# Patient Record
Sex: FEMALE | Race: ASIAN | NOT HISPANIC OR LATINO | Employment: UNEMPLOYED | URBAN - METROPOLITAN AREA
[De-identification: names, ages, dates, MRNs, and addresses within clinical notes are randomized per-mention and may not be internally consistent; named-entity substitution may affect disease eponyms.]

---

## 2018-07-18 ENCOUNTER — OFFICE VISIT (OUTPATIENT)
Dept: OBSTETRICS AND GYNECOLOGY | Age: 28
End: 2018-07-18

## 2018-07-18 VITALS
HEIGHT: 60 IN | WEIGHT: 90 LBS | DIASTOLIC BLOOD PRESSURE: 68 MMHG | SYSTOLIC BLOOD PRESSURE: 100 MMHG | BODY MASS INDEX: 17.67 KG/M2

## 2018-07-18 DIAGNOSIS — Z12.4 ROUTINE CERVICAL SMEAR: ICD-10-CM

## 2018-07-18 DIAGNOSIS — Z01.411 ENCOUNTER FOR GYNECOLOGICAL EXAMINATION WITH ABNORMAL FINDING: ICD-10-CM

## 2018-07-18 DIAGNOSIS — Z31.69 ENCOUNTER FOR PRECONCEPTION CONSULTATION: ICD-10-CM

## 2018-07-18 DIAGNOSIS — N93.9 ABNORMAL UTERINE BLEEDING (AUB): Primary | ICD-10-CM

## 2018-07-18 PROCEDURE — 99213 OFFICE O/P EST LOW 20 MIN: CPT | Performed by: OBSTETRICS & GYNECOLOGY

## 2018-07-18 PROCEDURE — 99385 PREV VISIT NEW AGE 18-39: CPT | Performed by: OBSTETRICS & GYNECOLOGY

## 2018-07-18 RX ORDER — FOLIC ACID 1 MG/1
1 TABLET ORAL DAILY
COMMUNITY
End: 2018-09-17

## 2018-07-18 NOTE — PROGRESS NOTES
Subjective     Chief Complaint   Patient presents with   • Gynecologic Exam     New AC, trying for pregnancy       History of Present Illness    Devika Ryan is a 28 y.o.  who presents for annual exam.  Patient is here for her first visit in our office.  She is here with her .  Patient is from [place] and is moved here in [place] for about 2 years.  She and her  would like to try for pregnancy in the next year or so.  She does have a history of chickenpox.  Her  is healthy.  He is a nonsmoker and has no history of testicular problems.    Her cycles have always been irregular.  She does not have hirsutism or galactorrhea.  She has not had any testing for irregular periods.  Her body weight is low at 90 pounds.  She does eat regular meals and exercises by walking about a half an hour daily.    Patient has never had a Pap smear.  Her menses are every 30-40days, lasting 7 days , dysmenorrhea mild, occurring first 1-2 days of flow   Obstetric History:  OB History      Para Term  AB Living    0 0 0 0 0 0    SAB TAB Ectopic Molar Multiple Live Births    0 0 0 0 0 0         Menstrual History:     Patient's last menstrual period was 2018.         Current contraception: coitus interruptus  History of abnormal Pap smear: no  Received Gardasil immunization: no  Perform regular self breast exam: no  Family history of uterine or ovarian cancer: no  Family History of colon cancer: no  Family history of breast cancer: no    Mammogram: not indicated.  Colonoscopy: not indicated.  DEXA: not indicated.    Exercise: walks daily for 30- 60 minutes   Calcium/Vitamin D: adequate intake    The following portions of the patient's history were reviewed and updated as appropriate: allergies, current medications, past family history, past medical history, past social history, past surgical history and problem list.    Review of Systems    Review of Systems   Constitutional: Negative for fatigue.  "  Respiratory: Negative for shortness of breath.    Gastrointestinal: Negative for abdominal pain.   Genitourinary: Negative for dysuria.   Neurological: Negative for headaches.   Psychiatric/Behavioral: Negative for dysphoric mood.         Objective   Physical Exam    /68   Ht 152.4 cm (60\")   Wt 40.8 kg (90 lb)   LMP 07/08/2018   BMI 17.58 kg/m²     General:   alert, appears stated age and cooperative   Neck: thyroid normal to palpation   Heart: regular rate and rhythm   Lungs: clear to auscultation bilaterally   Abdomen: soft, non-tender, without masses or organomegaly   Breast: inspection negative, no nipple discharge or bleeding, no masses or nodularity palpable   Vulva: normal, Bartholin's, Urethra, [place]'s normal   Vagina: normal mucosa, normal discharge   Cervix: no cervical motion tenderness and no lesions   Uterus: mobile, non-tender, normal shape and consistency   Adnexa: no mass, fullness, tenderness   Rectal: not indicated     Assessment/Plan   Devika was seen today for gynecologic exam.    Diagnoses and all orders for this visit:    Abnormal uterine bleeding (AUB)  -     TSH  -     Prolactin  -     Rubella Antibody, IgG    Encounter for gynecological examination with abnormal finding  -     IGP,rfx Aptima HPV All Pth    Routine cervical smear  -     IGP,rfx Aptima HPV All Pth      We discussed causes of irregular bleeding.  We will check labs today.  I encouraged the patient to do ovulation predictor kits.  If they are negative she will call.  We discussed that if she is not ovulating we could consider Clomid.  We discussed there is a risk of twins with Clomid pregnancies.  We also discussed patient's low body weight.  I discussed that sometimes with weight gain cycles will become ovulatory.  Continue folic acid.  All questions answered.  Breast self exam technique reviewed and patient encouraged to perform self-exam monthly.  Discussed healthy lifestyle modifications.  Recommended 30 minutes " of aerobic exercise five times per week.  Discussed calcium needs to prevent osteoporosis.

## 2018-07-19 ENCOUNTER — TELEPHONE (OUTPATIENT)
Dept: OBSTETRICS AND GYNECOLOGY | Age: 28
End: 2018-07-19

## 2018-07-19 DIAGNOSIS — N92.6 IRREGULAR PERIODS/MENSTRUAL CYCLES: Primary | ICD-10-CM

## 2018-07-19 LAB
PROLACTIN SERPL-MCNC: 24.1 NG/ML (ref 4.8–23.3)
RUBV IGG SERPL IA-ACNC: 8.49 INDEX
TSH SERPL DL<=0.005 MIU/L-ACNC: 3.27 MIU/ML (ref 0.27–4.2)

## 2018-07-19 NOTE — TELEPHONE ENCOUNTER
----- Message from Guille Thomas MD sent at 7/19/2018  2:16 PM EDT -----  Come in for repeat prolactin fasting and TSH with free t4 due to irregular cycles

## 2018-07-21 LAB
PROLACTIN SERPL-MCNC: 40.5 NG/ML (ref 4.8–23.3)
T4 FREE SERPL-MCNC: 1.66 NG/DL (ref 0.93–1.7)
TSH SERPL DL<=0.005 MIU/L-ACNC: 4.55 MIU/ML (ref 0.27–4.2)

## 2018-07-23 ENCOUNTER — TELEPHONE (OUTPATIENT)
Dept: OBSTETRICS AND GYNECOLOGY | Age: 28
End: 2018-07-23

## 2018-07-23 DIAGNOSIS — E22.1 HYPERPROLACTINEMIA (HCC): ICD-10-CM

## 2018-07-23 DIAGNOSIS — E03.8 SUBCLINICAL HYPOTHYROIDISM: Primary | ICD-10-CM

## 2018-07-23 PROBLEM — R79.89 ELEVATED PROLACTIN LEVEL: Status: ACTIVE | Noted: 2018-07-23

## 2018-07-23 NOTE — TELEPHONE ENCOUNTER
called requesting to speak with Dr. Thomas with some follow-up questions from her call to them this am. Please advise.

## 2018-07-24 LAB
CONV .: NORMAL
CYTOLOGIST CVX/VAG CYTO: NORMAL
CYTOLOGY CVX/VAG DOC THIN PREP: NORMAL
DX ICD CODE: NORMAL
HIV 1 & 2 AB SER-IMP: NORMAL
Lab: NORMAL
OTHER STN SPEC: NORMAL
PATH REPORT.FINAL DX SPEC: NORMAL
STAT OF ADQ CVX/VAG CYTO-IMP: NORMAL

## 2018-07-26 ENCOUNTER — TELEPHONE (OUTPATIENT)
Dept: OBSTETRICS AND GYNECOLOGY | Age: 28
End: 2018-07-26

## 2018-07-26 NOTE — TELEPHONE ENCOUNTER
Pt notd pap normal and blood work abnormal. Pt is going to see endocrinologist due to abnormal labs

## 2018-07-26 NOTE — TELEPHONE ENCOUNTER
----- Message from Guille Thomas MD sent at 7/25/2018  5:23 PM EDT -----  Please notify the pap is normal. We have takled about the blood work that is abnormal and the patient is referred to the endocrinologist.

## 2018-09-17 ENCOUNTER — OFFICE VISIT (OUTPATIENT)
Dept: ENDOCRINOLOGY | Age: 28
End: 2018-09-17

## 2018-09-17 VITALS
SYSTOLIC BLOOD PRESSURE: 108 MMHG | HEART RATE: 62 BPM | OXYGEN SATURATION: 95 % | BODY MASS INDEX: 18.22 KG/M2 | HEIGHT: 60 IN | WEIGHT: 92.8 LBS | DIASTOLIC BLOOD PRESSURE: 60 MMHG

## 2018-09-17 DIAGNOSIS — E03.8 SUBCLINICAL HYPOTHYROIDISM: Primary | ICD-10-CM

## 2018-09-17 DIAGNOSIS — R79.89 ELEVATED PROLACTIN LEVEL: ICD-10-CM

## 2018-09-17 PROCEDURE — 99204 OFFICE O/P NEW MOD 45 MIN: CPT | Performed by: INTERNAL MEDICINE

## 2018-09-17 NOTE — PROGRESS NOTES
Chief Complaint   Patient presents with   • Hypothyroidism   • Abnormal Lab   NEW PATIENT APPOINTMENT. HYPOTHYROIDISM/HYPERPROLACTINEMIA    Devika Ryan 28 y.o.  female presents as a new patient for the evaluation of Hypothyroidism. Consulted by .   Pt thyroid levels are checked as part of planning her pregnancy work up.  Prolactin levels were checked and when she reported some irregularities in her menstrual cycles.    Today in the clinic patient reports that her energy levels are good, weight has been stable, normal bowel movements, some hair loss, no increased sweating, no dry skin and sleep is okay.  Normal temperature preference.  No complaints of tremors, racing of heart or eye symptoms.  Denied c/o difficulty breathing, swallowing and change in voice.   No family hx of thyroid disease.     Menarche at age 15, periods have been always irregular, does get 1 period at least every 30-45 days, last for about 5-6 days, no heavy administration.  Never been pregnant.    Patient was noted to have incidental elevated prolactin levels on the blood work up due to the history of menstrual irregularities.  Not on any recreational drug usage, no complaints of breast tenderness, breast discharge, occasional headaches mainly related to her neck pain.  No temporal loss of vision.    Reviewed primary care physician's/consulting physician documentation and lab results :     I have reviewed the patient's allergies, medicines, past medical hx, family hx and social hx in detail.    No past medical history on file.    Family History   Problem Relation Age of Onset   • Heart disease Maternal Grandmother    • No Known Problems Mother    • No Known Problems Father        Social History     Social History   • Marital status:      Spouse name: N/A   • Number of children: N/A   • Years of education: N/A     Occupational History   • homemaker      Social History Main Topics   • Smoking status: Never Smoker   • Smokeless  "tobacco: Not on file   • Alcohol use No   • Drug use: No   • Sexual activity: Yes     Partners: Male     Birth control/ protection: Coitus interruptus     Other Topics Concern   • Not on file     Social History Narrative   • No narrative on file       Allergies   Allergen Reactions   • Penicillins Rash         Current Outpatient Prescriptions:   •  Prenatal Multivit-Min-Fe-FA (PRE- PO), Take  by mouth., Disp: , Rfl:      Review of Systems   Constitutional: Negative for appetite change, fatigue and fever.   Eyes: Positive for visual disturbance.   Respiratory: Negative for shortness of breath.    Cardiovascular: Negative for palpitations and leg swelling.   Gastrointestinal: Negative for abdominal pain and vomiting.   Endocrine: Negative for polydipsia and polyuria.   Musculoskeletal: Positive for neck pain. Negative for joint swelling.   Skin: Negative for rash.   Neurological: Negative for weakness and numbness.   Psychiatric/Behavioral: Negative for behavioral problems.       Objective:    /60   Pulse 62   Ht 152.4 cm (60\")   Wt 42.1 kg (92 lb 12.8 oz)   SpO2 95%   BMI 18.12 kg/m²     Physical Exam   Constitutional: She is oriented to person, place, and time. She appears well-nourished.   HENT:   Head: Normocephalic and atraumatic.   Eyes: Conjunctivae and EOM are normal. No scleral icterus.   Neck: Normal range of motion. Neck supple. No thyromegaly present.   Cardiovascular: Normal rate and normal heart sounds.  Exam reveals no friction rub.    No murmur heard.  Pulmonary/Chest: Effort normal and breath sounds normal. No stridor. She has no wheezes. She has no rales.   Abdominal: Soft. Bowel sounds are normal. She exhibits no distension. There is no tenderness.   Musculoskeletal: She exhibits no edema or tenderness.   Lymphadenopathy:     She has no cervical adenopathy.   Neurological: She is alert and oriented to person, place, and time.   Skin: Skin is warm and dry. She is not diaphoretic. " "  Psychiatric: She has a normal mood and affect.   Vitals reviewed.      Results Review:    I reviewed the patient's new clinical results.    Telephone on 07/19/2018   Component Date Value Ref Range Status   • Prolactin 07/20/2018 40.5* 4.8 - 23.3 ng/mL Final   • TSH 07/20/2018 4.550* 0.270 - 4.200 mIU/mL Final   • Free T4 07/20/2018 1.66  0.93 - 1.70 ng/dL Final         Devika was seen today for hypothyroidism and abnormal lab.    Diagnoses and all orders for this visit:    Subclinical hypothyroidism  -     TSH  -     T4, Free  -     Prolactin  -     Comprehensive Metabolic Panel  -     Macroprolactin  -     TSH; Future  -     T4, Free; Future    Elevated prolactin level (CMS/HCC)  -     TSH  -     T4, Free  -     Prolactin  -     Comprehensive Metabolic Panel  -     Macroprolactin  -     TSH; Future  -     T4, Free; Future      Hypothyroidism-levels are elevated  Explained to the patient that since she is planning pregnancy her TSH levels should be closely monitored and well controlled.  Will repeat the thyroid function tests today and based on the levels will start the patient on levothyroxin  Discussed the benefits and the side effects of the medication  Explained to the patient that during pregnancy thyroid levels needs to be closely monitored and her dosage was need to be adjusted.    Elevated prolactin level could be related to hypothyroidism  Will repeat the prolactin levels.  Illness prolactin levels are still elevated after patient being levothyroid will have to perform an MRI of the pituitary  Also discussed with the patient options of treatment for hyperprolactinemia-Cabergoline/bromocriptine.  We will check macro prolactin levels.    Thank you for asking me to see your patient, Devika Ryan in consultation.        Kristen Lainez MD  09/17/18    EMR Dragon / transcription disclaimer:     \"Dictated utilizing Dragon dictation\".          "

## 2018-09-21 DIAGNOSIS — E22.1 HYPERPROLACTINEMIA (HCC): Primary | ICD-10-CM

## 2018-09-21 LAB
ALBUMIN SERPL-MCNC: 4.5 G/DL (ref 3.5–5.2)
ALBUMIN/GLOB SERPL: 1.4 G/DL
ALP SERPL-CCNC: 52 U/L (ref 39–117)
ALT SERPL-CCNC: 20 U/L (ref 1–33)
AST SERPL-CCNC: 14 U/L (ref 1–32)
BILIRUB SERPL-MCNC: 0.2 MG/DL (ref 0.1–1.2)
BUN SERPL-MCNC: 15 MG/DL (ref 6–20)
BUN/CREAT SERPL: 20.8 (ref 7–25)
CALCIUM SERPL-MCNC: 9.6 MG/DL (ref 8.6–10.5)
CHLORIDE SERPL-SCNC: 103 MMOL/L (ref 98–107)
CO2 SERPL-SCNC: 26 MMOL/L (ref 22–29)
CREAT SERPL-MCNC: 0.72 MG/DL (ref 0.57–1)
GLOBULIN SER CALC-MCNC: 3.2 GM/DL
GLUCOSE SERPL-MCNC: 106 MG/DL (ref 65–99)
MACROPROLACTIN/PROLACTIN SFR SERPL: 4 %
POTASSIUM SERPL-SCNC: 4.2 MMOL/L (ref 3.5–5.2)
PROLACTIN SERPL-MCNC: 27 NG/ML
PROLACTIN SERPL-MCNC: 30.9 NG/ML (ref 4.8–23.3)
PROLACTIN.MONOMERIC SERPL-MCNC: 26 NG/ML
PROT SERPL-MCNC: 7.7 G/DL (ref 6–8.5)
SODIUM SERPL-SCNC: 142 MMOL/L (ref 136–145)
T4 FREE SERPL-MCNC: 1.65 NG/DL (ref 0.93–1.7)
TSH SERPL DL<=0.005 MIU/L-ACNC: 3.74 MIU/ML (ref 0.27–4.2)

## 2018-09-21 NOTE — PROGRESS NOTES
Tried to call patient no voice mail was set up to leave message sent message though [employer] and mailed results

## 2018-09-24 ENCOUNTER — TELEPHONE (OUTPATIENT)
Dept: ENDOCRINOLOGY | Age: 28
End: 2018-09-24

## 2018-09-24 NOTE — TELEPHONE ENCOUNTER
spoke with patient  about patient lad results and that someone will call to setup the appointment for patient MRI-    ---- Message from Stacey Goode sent at 9/24/2018 11:56 AM EDT -----  Contact: Dolly -   Patient  is calling in regards to patient lab results and wants a call back .    Best [phone]

## 2018-09-28 RX ORDER — LEVOTHYROXINE SODIUM 0.07 MG/1
TABLET ORAL
Qty: 30 TABLET | Refills: 11 | Status: SHIPPED | OUTPATIENT
Start: 2018-09-28 | End: 2018-12-20 | Stop reason: DRUGHIGH

## 2018-10-20 ENCOUNTER — APPOINTMENT (OUTPATIENT)
Dept: MRI IMAGING | Facility: HOSPITAL | Age: 28
End: 2018-10-20
Attending: INTERNAL MEDICINE

## 2018-11-16 ENCOUNTER — TELEPHONE (OUTPATIENT)
Dept: ENDOCRINOLOGY | Age: 28
End: 2018-11-16

## 2018-11-16 NOTE — TELEPHONE ENCOUNTER
Called patient was unable to leave voice mail will sent results to patient madan          ----- Message from Vivian Peguero sent at 11/16/2018  8:44 AM EST -----  Contact: [phone]  Pt is looking for the results of the MRI    Please call patient

## 2018-12-11 ENCOUNTER — LAB (OUTPATIENT)
Dept: ENDOCRINOLOGY | Age: 28
End: 2018-12-11

## 2018-12-11 DIAGNOSIS — E03.8 SUBCLINICAL HYPOTHYROIDISM: ICD-10-CM

## 2018-12-11 DIAGNOSIS — E22.1 HYPERPROLACTINEMIA (HCC): ICD-10-CM

## 2018-12-11 DIAGNOSIS — R79.89 ELEVATED PROLACTIN LEVEL: ICD-10-CM

## 2018-12-12 LAB
PROLACTIN SERPL-MCNC: 24 NG/ML (ref 4.8–23.3)
T4 FREE SERPL-MCNC: 1.91 NG/DL (ref 0.93–1.7)
TSH SERPL DL<=0.005 MIU/L-ACNC: 0.8 MIU/ML (ref 0.27–4.2)

## 2018-12-20 RX ORDER — LEVOTHYROXINE SODIUM 0.03 MG/1
25 TABLET ORAL DAILY
Qty: 30 TABLET | Refills: 11 | Status: SHIPPED | OUTPATIENT
Start: 2018-12-20 | End: 2018-12-24 | Stop reason: SDUPTHER

## 2018-12-20 NOTE — TELEPHONE ENCOUNTER
Spoke with patient  homer let him know that Dr Lainez wants to change her medication from  37.5 mcg of the synthroid to 25 mcg of the synthroid  Patient  voice understanding    ----- Message from Edda Viera sent at 12/19/2018  1:58 PM EST -----  Contact: Kinga -     Kinga said patient thought her appointment was Friday and she no showed her appt today. He is asking if patient needs an adjustment on her medication based on 12-11-18 labs.     Kinga - [phone]

## 2018-12-24 RX ORDER — LEVOTHYROXINE SODIUM 0.03 MG/1
25 TABLET ORAL DAILY
Qty: 30 TABLET | Refills: 11 | Status: SHIPPED | OUTPATIENT
Start: 2018-12-24 | End: 2020-01-17

## 2019-02-20 ENCOUNTER — LAB (OUTPATIENT)
Dept: ENDOCRINOLOGY | Age: 29
End: 2019-02-20

## 2019-02-20 DIAGNOSIS — E03.8 SUBCLINICAL HYPOTHYROIDISM: ICD-10-CM

## 2019-02-20 DIAGNOSIS — E22.1 HYPERPROLACTINEMIA (HCC): Primary | ICD-10-CM

## 2019-02-20 DIAGNOSIS — E22.1 HYPERPROLACTINEMIA (HCC): ICD-10-CM

## 2019-02-21 LAB
ALBUMIN SERPL-MCNC: 4.6 G/DL (ref 3.5–5.5)
ALBUMIN/GLOB SERPL: 1.7 {RATIO} (ref 1.2–2.2)
ALP SERPL-CCNC: 59 IU/L (ref 39–117)
ALT SERPL-CCNC: 16 IU/L (ref 0–32)
AST SERPL-CCNC: 14 IU/L (ref 0–40)
BILIRUB SERPL-MCNC: 0.3 MG/DL (ref 0–1.2)
BUN SERPL-MCNC: 12 MG/DL (ref 6–20)
BUN/CREAT SERPL: 18 (ref 9–23)
CALCIUM SERPL-MCNC: 9.4 MG/DL (ref 8.7–10.2)
CHLORIDE SERPL-SCNC: 109 MMOL/L (ref 96–106)
CO2 SERPL-SCNC: 22 MMOL/L (ref 20–29)
CREAT SERPL-MCNC: 0.66 MG/DL (ref 0.57–1)
GLOBULIN SER CALC-MCNC: 2.7 G/DL (ref 1.5–4.5)
GLUCOSE SERPL-MCNC: 110 MG/DL (ref 65–99)
POTASSIUM SERPL-SCNC: 4.3 MMOL/L (ref 3.5–5.2)
PROLACTIN SERPL-MCNC: 26.3 NG/ML (ref 4.8–23.3)
PROT SERPL-MCNC: 7.3 G/DL (ref 6–8.5)
SODIUM SERPL-SCNC: 146 MMOL/L (ref 134–144)
T4 FREE SERPL-MCNC: 1.7 NG/DL (ref 0.82–1.77)
TSH SERPL DL<=0.005 MIU/L-ACNC: 1.07 UIU/ML (ref 0.45–4.5)

## 2019-02-25 ENCOUNTER — OFFICE VISIT (OUTPATIENT)
Dept: ENDOCRINOLOGY | Age: 29
End: 2019-02-25

## 2019-02-25 VITALS
DIASTOLIC BLOOD PRESSURE: 70 MMHG | SYSTOLIC BLOOD PRESSURE: 108 MMHG | HEIGHT: 61 IN | HEART RATE: 101 BPM | BODY MASS INDEX: 18.28 KG/M2 | WEIGHT: 96.8 LBS

## 2019-02-25 DIAGNOSIS — E22.1 HYPERPROLACTINEMIA (HCC): Primary | ICD-10-CM

## 2019-02-25 DIAGNOSIS — E03.9 ACQUIRED HYPOTHYROIDISM: ICD-10-CM

## 2019-02-25 PROCEDURE — 99214 OFFICE O/P EST MOD 30 MIN: CPT | Performed by: INTERNAL MEDICINE

## 2019-02-25 RX ORDER — CABERGOLINE 0.5 MG/1
TABLET ORAL
Qty: 6 TABLET | Refills: 3 | Status: SHIPPED | OUTPATIENT
Start: 2019-02-25 | End: 2020-02-25 | Stop reason: SDUPTHER

## 2019-02-25 NOTE — PROGRESS NOTES
29 y.o.    Patient Care Team:  Provider, No Known as PCP - General    Chief Complaint:        F/u HYPOTHYROIDISM  HYPERPROLACTINEMIA  HERE TO DISCUSS LAB RESULTS.     Subjective     HPI    29-year-old  female is here as a follow-up for the evaluation of elevated prolactin levels, hypothyroidism.  Patient's thyroid levels were checked as a part of her planning to get pregnant.  Prolactin levels were checked when she reported some irregularities in her menstrual cycles.    Based on her workup during her last visit patient was started on levothyroxine 25 mcg oral daily.  With improvement of her thyroid levels her prolactin levels were also noted to be improving.  However prolactin levels are still mildly elevated-24-26 on her latest blood workup.  MRI of the pituitary did not show any pituitary microadenoma or abnormal features.    Today in the clinic she reports that her energy levels are okay, weight has been stable, normal bowel movements, some hair loss, no increased sweating or dry skin.  Sleep is good.  Normal temperature preference.  No hypothyroid symptoms.    Last menstrual cycle was in January 2019, she reports that her.  Her stated irregular happens anywhere between 40-50 days.  Menstrual flow is decent.     Reviewed primary care physician's/consulting physician documentation and lab results :     Interval History      The following portions of the patient's history were reviewed and updated as appropriate: allergies, current medications, past family history, past medical history, past social history, past surgical history and problem list.    History reviewed. No pertinent past medical history.  Family History   Problem Relation Age of Onset   • Heart disease Maternal Grandmother    • No Known Problems Mother    • No Known Problems Father      Social History     Socioeconomic History   • Marital status:      Spouse name: Not on file   • Number of children: Not on file   • Years of education:  "Not on file   • Highest education level: Not on file   Social Needs   • Financial resource strain: Not on file   • Food insecurity - worry: Not on file   • Food insecurity - inability: Not on file   • Transportation needs - medical: Not on file   • Transportation needs - non-medical: Not on file   Occupational History   • Occupation: homemaker   Tobacco Use   • Smoking status: Never Smoker   • Smokeless tobacco: Never Used   Substance and Sexual Activity   • Alcohol use: No   • Drug use: No   • Sexual activity: Yes     Partners: Male     Birth control/protection: Coitus interruptus   Other Topics Concern   • Not on file   Social History Narrative   • Not on file     Allergies   Allergen Reactions   • Penicillins Rash       Current Outpatient Medications:   •  levothyroxine (SYNTHROID, LEVOTHROID) 25 MCG tablet, Take 1 tablet by mouth Daily., Disp: 30 tablet, Rfl: 11  •  Prenatal Multivit-Min-Fe-FA (PRE-ANAHI PO), Take  by mouth., Disp: , Rfl:   •  cabergoline (DOSTINEX) 0.5 MG tablet, TAKE HALF A TABLET A WEEK, Disp: 6 tablet, Rfl: 3        Review of Systems   Constitutional: Negative for chills, fatigue and fever.   Cardiovascular: Negative for chest pain and palpitations.   Gastrointestinal: Negative for abdominal pain, constipation, diarrhea, nausea and vomiting.   Endocrine: Negative for cold intolerance and heat intolerance.       Objective       Vitals:    19 1255   BP: 108/70   Pulse: 101   Weight: 43.9 kg (96 lb 12.8 oz)   Height: 154.9 cm (61\")     Body mass index is 18.29 kg/m².      Physical Exam   Constitutional: She is oriented to person, place, and time. She appears well-nourished.   HENT:   Head: Normocephalic and atraumatic.   Eyes: Conjunctivae and EOM are normal. No scleral icterus.   Neck: Normal range of motion. Neck supple. No thyromegaly present.   Cardiovascular: Normal rate and normal heart sounds. Exam reveals no friction rub.   No murmur heard.  Pulmonary/Chest: Effort normal and " breath sounds normal. No stridor. She has no wheezes. She has no rales.   Abdominal: Soft. Bowel sounds are normal. She exhibits no distension. There is no tenderness.   Musculoskeletal: She exhibits no edema or tenderness.   Lymphadenopathy:     She has no cervical adenopathy.   Neurological: She is alert and oriented to person, place, and time.   Skin: Skin is warm and dry. She is not diaphoretic.   Psychiatric: She has a normal mood and affect.   Vitals reviewed.    Results Review:     I reviewed the patient's new clinical results and mentioned them above in HPI and in plan as well.    Medical records reviewed  Summary: done      Lab on 02/20/2019   Component Date Value Ref Range Status   • Prolactin 02/20/2019 26.3* 4.8 - 23.3 ng/mL Final   • TSH 02/20/2019 1.070  0.450 - 4.500 uIU/mL Final   • Free T4 02/20/2019 1.70  0.82 - 1.77 ng/dL Final   • Glucose 02/20/2019 110* 65 - 99 mg/dL Final   • BUN 02/20/2019 12  6 - 20 mg/dL Final   • Creatinine 02/20/2019 0.66  0.57 - 1.00 mg/dL Final   • eGFR Non  Am 02/20/2019 119  >59 mL/min/1.73 Final   • eGFR African Am 02/20/2019 137  >59 mL/min/1.73 Final   • BUN/Creatinine Ratio 02/20/2019 18  9 - 23 Final   • Sodium 02/20/2019 146* 134 - 144 mmol/L Final   • Potassium 02/20/2019 4.3  3.5 - 5.2 mmol/L Final   • Chloride 02/20/2019 109* 96 - 106 mmol/L Final   • Total CO2 02/20/2019 22  20 - 29 mmol/L Final   • Calcium 02/20/2019 9.4  8.7 - 10.2 mg/dL Final   • Total Protein 02/20/2019 7.3  6.0 - 8.5 g/dL Final   • Albumin 02/20/2019 4.6  3.5 - 5.5 g/dL Final   • Globulin 02/20/2019 2.7  1.5 - 4.5 g/dL Final   • A/G Ratio 02/20/2019 1.7  1.2 - 2.2 Final   • Total Bilirubin 02/20/2019 0.3  0.0 - 1.2 mg/dL Final   • Alkaline Phosphatase 02/20/2019 59  39 - 117 IU/L Final   • AST (SGOT) 02/20/2019 14  0 - 40 IU/L Final   • ALT (SGPT) 02/20/2019 16  0 - 32 IU/L Final     No results found for: HGBA1C  Lab Results   Component Value Date    CREATININE 0.66 02/20/2019  "    Imaging Results (most recent)     None                Assessment and Plan:    Diagnoses and all orders for this visit:    Hyperprolactinemia (CMS/HCC)  -     TSH; Future  -     T4, Free; Future  -     Hemoglobin A1c; Future  -     Vitamin D 25 Hydroxy; Future  -     Vitamin B12 & Folate; Future  -     Prolactin; Future  -     Basic Metabolic Panel; Future    Acquired hypothyroidism  -     TSH; Future  -     T4, Free; Future  -     Hemoglobin A1c; Future  -     Vitamin D 25 Hydroxy; Future  -     Vitamin B12 & Folate; Future  -     Prolactin; Future  -     Basic Metabolic Panel; Future    Other orders  -     cabergoline (DOSTINEX) 0.5 MG tablet; TAKE HALF A TABLET A WEEK      Hyperprolactinemia  Prolactin levels are weakly mildly elevated  Patient is asymptomatic and pituitary MRI was unremarkable  Starting the patient on cabergoline 1.25 mg once a week to regularize her menstrual cycles and help conceive.  Advised the patient to stop the medication immediately when she gets pregnant.    Hypothyroidism  Continue levothyroxine 25 mcg oral daily  Recommended the patient to call and alert us immediately when she gets to know she is pregnant sTSH levels are specific in each trimester.      Reviewed Lab results with the patient.             Kristen Lainez MD  02/25/19    EMR Dragon / transcription disclaimer:     \"Dictated utilizing Dragon dictation\".  "

## 2019-05-31 ENCOUNTER — TELEPHONE (OUTPATIENT)
Dept: OBSTETRICS AND GYNECOLOGY | Age: 29
End: 2019-05-31

## 2019-06-04 ENCOUNTER — OFFICE VISIT (OUTPATIENT)
Dept: OBSTETRICS AND GYNECOLOGY | Age: 29
End: 2019-06-04

## 2019-06-04 VITALS
SYSTOLIC BLOOD PRESSURE: 110 MMHG | WEIGHT: 96 LBS | BODY MASS INDEX: 18.85 KG/M2 | HEIGHT: 60 IN | DIASTOLIC BLOOD PRESSURE: 70 MMHG

## 2019-06-04 DIAGNOSIS — N76.0 ACUTE VAGINITIS: ICD-10-CM

## 2019-06-04 DIAGNOSIS — R31.9 HEMATURIA, UNSPECIFIED TYPE: Primary | ICD-10-CM

## 2019-06-04 LAB
BILIRUB BLD-MCNC: NEGATIVE MG/DL
CLARITY, POC: CLEAR
COLOR UR: ABNORMAL
GLUCOSE UR STRIP-MCNC: NEGATIVE MG/DL
KETONES UR QL: NEGATIVE
LEUKOCYTE EST, POC: ABNORMAL
NITRITE UR-MCNC: NEGATIVE MG/ML
PH UR: 5.5 [PH] (ref 5–8)
PROT UR STRIP-MCNC: NEGATIVE MG/DL
RBC # UR STRIP: ABNORMAL /UL
SP GR UR: 1 (ref 1–1.03)
UROBILINOGEN UR QL: NORMAL

## 2019-06-04 PROCEDURE — 81002 URINALYSIS NONAUTO W/O SCOPE: CPT | Performed by: NURSE PRACTITIONER

## 2019-06-04 PROCEDURE — 99213 OFFICE O/P EST LOW 20 MIN: CPT | Performed by: NURSE PRACTITIONER

## 2019-06-04 RX ORDER — LACTOBACILLUS RHAMNOSUS GG 10B CELL
CAPSULE ORAL DAILY
COMMUNITY
End: 2020-11-02

## 2019-06-04 NOTE — PROGRESS NOTES
"Subjective   Imani Ryan is a 29 y.o. female is being seen today for   Chief Complaint   Patient presents with   • Vaginitis     Pt c/o some blood in urine, burning with urination and vaginal discharge.    .    History of Present Illness     Patient noticed some vaginal itching and some burning after urination and thought she saw blood in urine  They are TTC and did get a +OPK this month  It was right around that time that she noticed the symptoms and since then they have gone away  She currently denies any itching or dysuria but wanted to check and make sure everything was ok      The following portions of the patient's history were reviewed and updated as appropriate: allergies, current medications, past family history, past medical history, past social history, past surgical history and problem list.    /70   Ht 152.4 cm (60\")   Wt 43.5 kg (96 lb)   LMP 05/07/2019   BMI 18.75 kg/m²         Review of Systems   Constitutional: Negative.    HENT: Negative.    Eyes: Negative.    Respiratory: Negative.    Cardiovascular: Negative.    Gastrointestinal: Negative.    Endocrine: Negative.    Genitourinary: Negative.    Musculoskeletal: Negative.    Skin: Negative.    Allergic/Immunologic: Negative.    Neurological: Negative.    Hematological: Negative.    Psychiatric/Behavioral: Negative.        Objective   Physical Exam   Constitutional: She is oriented to person, place, and time. She appears well-developed and well-nourished.   Genitourinary: Vagina normal and uterus normal. Uterus is not tender. Cervix exhibits no motion tenderness, no discharge and no friability.   Neurological: She is alert and oriented to person, place, and time.   Skin: Skin is warm and dry.   Psychiatric: She has a normal mood and affect.         Assessment/Plan   Imani was seen today for vaginitis.    Diagnoses and all orders for this visit:    Hematuria, unspecified type  -     POC Urinalysis Dipstick  -     NuSwab BV & Candida - " Swab, Vagina  -     Urine Culture - Urine, Urine, Clean Catch      Normal exam today, moderate blood in urine patient patient is asymptomatic at this time.  Will check vaginal culture and urine culture and call with results. Increase water intake. She will check pregnancy test if late on cycle and call with +.  Otherwise continue OPKs and timed IC, call with + HCG.

## 2019-06-07 ENCOUNTER — TELEPHONE (OUTPATIENT)
Dept: OBSTETRICS AND GYNECOLOGY | Age: 29
End: 2019-06-07

## 2019-06-07 LAB
A VAGINAE DNA VAG QL NAA+PROBE: NORMAL SCORE
BVAB2 DNA VAG QL NAA+PROBE: NORMAL SCORE
C ALBICANS DNA VAG QL NAA+PROBE: NEGATIVE
C GLABRATA DNA VAG QL NAA+PROBE: NEGATIVE
MEGA1 DNA VAG QL NAA+PROBE: NORMAL SCORE

## 2019-06-08 LAB
BACTERIA UR CULT: ABNORMAL
BACTERIA UR CULT: ABNORMAL
OTHER ANTIBIOTIC SUSC ISLT: ABNORMAL

## 2019-06-10 RX ORDER — NITROFURANTOIN 25; 75 MG/1; MG/1
100 CAPSULE ORAL 2 TIMES DAILY
Qty: 14 CAPSULE | Refills: 0 | Status: SHIPPED | OUTPATIENT
Start: 2019-06-10 | End: 2020-01-13

## 2019-08-24 ENCOUNTER — RESULTS ENCOUNTER (OUTPATIENT)
Dept: ENDOCRINOLOGY | Age: 29
End: 2019-08-24

## 2019-08-24 DIAGNOSIS — E22.1 HYPERPROLACTINEMIA (HCC): ICD-10-CM

## 2019-08-24 DIAGNOSIS — E03.9 ACQUIRED HYPOTHYROIDISM: ICD-10-CM

## 2020-01-13 ENCOUNTER — OFFICE VISIT (OUTPATIENT)
Dept: OBSTETRICS AND GYNECOLOGY | Age: 30
End: 2020-01-13

## 2020-01-13 VITALS
WEIGHT: 100 LBS | HEIGHT: 60 IN | BODY MASS INDEX: 19.63 KG/M2 | SYSTOLIC BLOOD PRESSURE: 102 MMHG | DIASTOLIC BLOOD PRESSURE: 64 MMHG

## 2020-01-13 DIAGNOSIS — R79.89 ELEVATED PROLACTIN LEVEL: ICD-10-CM

## 2020-01-13 DIAGNOSIS — E03.8 SUBCLINICAL HYPOTHYROIDISM: ICD-10-CM

## 2020-01-13 DIAGNOSIS — Z11.51 SPECIAL SCREENING EXAMINATION FOR HUMAN PAPILLOMAVIRUS (HPV): ICD-10-CM

## 2020-01-13 DIAGNOSIS — Z12.4 SCREENING FOR MALIGNANT NEOPLASM OF THE CERVIX: ICD-10-CM

## 2020-01-13 DIAGNOSIS — N97.9 INFERTILITY, FEMALE: Primary | ICD-10-CM

## 2020-01-13 PROCEDURE — 99395 PREV VISIT EST AGE 18-39: CPT | Performed by: OBSTETRICS & GYNECOLOGY

## 2020-01-13 NOTE — PROGRESS NOTES
Subjective     Chief Complaint   Patient presents with   • Gynecologic Exam     AC and wants to discuss trying       History of Present Illness    Devika Ryan is a 29 y.o.  who presents for annual exam.  The patient is here today with her .  They have been trying for pregnancy for about 1 year.  We did check some labs last year which showed some abnormalities of the thyroid and elevated prolactin.  Patient saw endocrinology but did not do her follow-up labs in August.  She is currently on once weekly dosing for elevated prolactin and daily thyroid dosing.  She has gained about 5 pounds.  She did have a positive ovulation predictor kit but late in the cycle on day 17.  She is 2 days late for her cycle but urine pregnancy test at home was negative.  She would like to have blood pregnancy test done.  Her  did a normal semen analysis through [email or web address].  He is able to show me the results on his phone which I did review.  There is no paper copy.  Her cycles have regulated on her 2 medications except for 2 menses in .    Her menses are regular every 30 days, lasting 6-8 days, dysmenorrhea mild, occurring first 1-2 days of flow   Obstetric History:  OB History        0    Para   0    Term   0       0    AB   0    Living   0       SAB   0    TAB   0    Ectopic   0    Molar   0    Multiple   0    Live Births   0               Menstrual History:     Patient's last menstrual period was 2019.         Current contraception: none  History of abnormal Pap smear: no  Received Gardasil immunization: no  Perform regular self breast exam : no  Family history of uterine or ovarian cancer: no  Family History of colon cancer: no  Family history of breast cancer: no    Mammogram: not indicated.  Colonoscopy: not indicated.  DEXA: not indicated.    Exercise: yoga 3-4 times week   Calcium/Vitamin D: adequate intake    The following portions of the patient's history were  "reviewed and updated as appropriate: allergies, current medications, past family history, past medical history, past social history, past surgical history and problem list.    Review of Systems    Review of Systems   Constitutional: Negative for fatigue.   Respiratory: Negative for shortness of breath.    Gastrointestinal: Negative for abdominal pain.   Genitourinary: Negative for dysuria.   Neurological: Negative for headaches.   Psychiatric/Behavioral: Negative for dysphoric mood.         Objective   Physical Exam    /64   Ht 152.4 cm (60\")   Wt 45.4 kg (100 lb)   LMP 12/11/2019   Breastfeeding No   BMI 19.53 kg/m²     General:   alert, appears stated age and cooperative   Neck: thyroid normal to palpation   Heart: regular rate and rhythm   Lungs: clear to auscultation bilaterally   Abdomen: soft, non-tender, without masses or organomegaly   Breast: inspection negative, no nipple discharge or bleeding, no masses or nodularity palpable   Vulva: normal, Bartholin's, Urethra, [place]'s normal   Vagina: normal mucosa, normal discharge   Cervix: no cervical motion tenderness and no lesions   Uterus: non-tender, normal shape and consistency   Adnexa: no mass, fullness, tenderness   Rectal: not indicated     Assessment/Plan   Imani was seen today for gynecologic exam.    Diagnoses and all orders for this visit:    Infertility, female  -     HCG, B-subunit, Quantitative  -     Rubella Antibody, IgG  -     Ambulatory Referral to Infertility  -     Basic Metabolic Panel  -     Prolactin  -     Vitamin B12  -     Folate  -     Vitamin D 25 Hydroxy  -     Hemoglobin A1c  -     T4, Free  -     TSH    Elevated prolactin level (CMS/HCC)  -     HCG, B-subunit, Quantitative  -     Rubella Antibody, IgG  -     Ambulatory Referral to Infertility  -     Basic Metabolic Panel  -     Prolactin  -     Vitamin B12  -     Folate  -     Vitamin D 25 Hydroxy  -     Hemoglobin A1c  -     T4, Free  -     TSH    Subclinical " hypothyroidism  -     HCG, B-subunit, Quantitative  -     Rubella Antibody, IgG  -     Ambulatory Referral to Infertility  -     Basic Metabolic Panel  -     Prolactin  -     Vitamin B12  -     Folate  -     Vitamin D 25 Hydroxy  -     Hemoglobin A1c  -     T4, Free  -     TSH    Screening for malignant neoplasm of the cervix  -     IGP, Aptima HPV, Rfx 16 / 18,45    Special screening examination for human papillomavirus (HPV)  -     IGP, Aptima HPV, Rfx 16 / 18,45    Other orders  -     clomiPHENE (CLOMID) 50 MG tablet; Take 1 tablet by mouth Daily.    We had a long discussion.  We will check the labs that endocrinology ordered and also add rubella titer and serum hCG.  Patient will return in a few weeks for a pelvic ultrasound.  We discussed that the patient is ovulating but ovulating late in the cycle.  We did discuss empiric Clomid.  She will be referred to infertility through Dr. Moses.  We went through her cycle in detail and reviewed the calendar.  We discussed risk and benefits of Clomid including 15% risk of twins, risk of ovarian cyst and hot flashes.  I will call the patient with her lab results and she will come back for her ultrasound.    All questions answered.  Breast self exam technique reviewed and patient encouraged to perform self-exam monthly.  Discussed healthy lifestyle modifications.  Recommended 30 minutes of aerobic exercise five times per week.  Discussed calcium needs to prevent osteoporosis.

## 2020-01-14 ENCOUNTER — TELEPHONE (OUTPATIENT)
Dept: OBSTETRICS AND GYNECOLOGY | Age: 30
End: 2020-01-14

## 2020-01-14 LAB
25(OH)D3+25(OH)D2 SERPL-MCNC: 33.8 NG/ML (ref 30–100)
BUN SERPL-MCNC: 15 MG/DL (ref 6–20)
BUN/CREAT SERPL: 18.5 (ref 7–25)
CALCIUM SERPL-MCNC: 9.7 MG/DL (ref 8.6–10.5)
CHLORIDE SERPL-SCNC: 103 MMOL/L (ref 98–107)
CO2 SERPL-SCNC: 26.7 MMOL/L (ref 22–29)
CREAT SERPL-MCNC: 0.81 MG/DL (ref 0.57–1)
FOLATE SERPL-MCNC: >20 NG/ML (ref 4.78–24.2)
GLUCOSE SERPL-MCNC: 92 MG/DL (ref 65–99)
HBA1C MFR BLD: 5.3 % (ref 4.8–5.6)
HCG INTACT+B SERPL-ACNC: <0.5 MIU/ML
POTASSIUM SERPL-SCNC: 4.7 MMOL/L (ref 3.5–5.2)
PROLACTIN SERPL-MCNC: 5.1 NG/ML (ref 4.8–23.3)
RUBV IGG SERPL IA-ACNC: 9.2 INDEX
SODIUM SERPL-SCNC: 145 MMOL/L (ref 136–145)
T4 FREE SERPL-MCNC: 1.69 NG/DL (ref 0.93–1.7)
TSH SERPL DL<=0.005 MIU/L-ACNC: 3 UIU/ML (ref 0.27–4.2)
VIT B12 SERPL-MCNC: 1167 PG/ML (ref 211–946)

## 2020-01-14 NOTE — TELEPHONE ENCOUNTER
----- Message from Guille Thomas MD sent at 1/14/2020 12:38 PM EST -----  Please notify blood work is normal.

## 2020-01-16 ENCOUNTER — TELEPHONE (OUTPATIENT)
Dept: OBSTETRICS AND GYNECOLOGY | Age: 30
End: 2020-01-16

## 2020-01-16 LAB
CYTOLOGIST CVX/VAG CYTO: NORMAL
CYTOLOGY CVX/VAG DOC CYTO: NORMAL
CYTOLOGY CVX/VAG DOC THIN PREP: NORMAL
DX ICD CODE: NORMAL
HIV 1 & 2 AB SER-IMP: NORMAL
HPV I/H RISK 4 DNA CVX QL PROBE+SIG AMP: NEGATIVE
Lab: NORMAL
OTHER STN SPEC: NORMAL
STAT OF ADQ CVX/VAG CYTO-IMP: NORMAL

## 2020-01-16 NOTE — TELEPHONE ENCOUNTER
----- Message from Guille Thomas MD sent at 1/16/2020  9:44 AM EST -----  Please notify pap is normal.

## 2020-01-17 ENCOUNTER — TELEPHONE (OUTPATIENT)
Dept: OBSTETRICS AND GYNECOLOGY | Age: 30
End: 2020-01-17

## 2020-01-17 RX ORDER — LEVOTHYROXINE SODIUM 0.03 MG/1
TABLET ORAL
Qty: 30 TABLET | Refills: 10 | Status: SHIPPED | OUTPATIENT
Start: 2020-01-17 | End: 2020-02-25 | Stop reason: SDUPTHER

## 2020-02-03 ENCOUNTER — PROCEDURE VISIT (OUTPATIENT)
Dept: OBSTETRICS AND GYNECOLOGY | Age: 30
End: 2020-02-03

## 2020-02-03 ENCOUNTER — OFFICE VISIT (OUTPATIENT)
Dept: OBSTETRICS AND GYNECOLOGY | Age: 30
End: 2020-02-03

## 2020-02-03 VITALS
DIASTOLIC BLOOD PRESSURE: 66 MMHG | WEIGHT: 99 LBS | HEIGHT: 60 IN | SYSTOLIC BLOOD PRESSURE: 114 MMHG | BODY MASS INDEX: 19.44 KG/M2

## 2020-02-03 DIAGNOSIS — N92.6 IRREGULAR PERIODS/MENSTRUAL CYCLES: ICD-10-CM

## 2020-02-03 DIAGNOSIS — N93.9 ABNORMAL UTERINE BLEEDING (AUB): ICD-10-CM

## 2020-02-03 DIAGNOSIS — N97.9 INFERTILITY, FEMALE: ICD-10-CM

## 2020-02-03 DIAGNOSIS — R79.89 ELEVATED PROLACTIN LEVEL: ICD-10-CM

## 2020-02-03 DIAGNOSIS — E03.8 SUBCLINICAL HYPOTHYROIDISM: Primary | ICD-10-CM

## 2020-02-03 DIAGNOSIS — N97.9 INFERTILITY, FEMALE: Primary | ICD-10-CM

## 2020-02-03 PROCEDURE — 76830 TRANSVAGINAL US NON-OB: CPT | Performed by: OBSTETRICS & GYNECOLOGY

## 2020-02-03 PROCEDURE — 99213 OFFICE O/P EST LOW 20 MIN: CPT | Performed by: OBSTETRICS & GYNECOLOGY

## 2020-02-03 NOTE — PROGRESS NOTES
"  Chief [id number] for infertility.    History of present illness- Patient is a 30 y.o.  who is here for infertility.  Patient is on day 7 of her cycle currently.  She reports that she has had positive ovulation predictor kits.  Kits are positive late in the cycle 1 approximately day 17.  The patient's 's semen analysis was normal.  She and her  would like to consider starting Clomid next month.  They were wondering if the patient's fallopian tubes are open.  There is no history of infections or pelvic surgery.  Patient is taking her Synthroid and Dostinex.        /66   Ht 152.4 cm (60\")   Wt 44.9 kg (99 lb)   LMP 2020   Breastfeeding No   BMI 19.33 kg/m²   Physical Exam   Constitutional: She appears well-developed and well-nourished. No distress.   Psychiatric: She has a normal mood and affect. Her behavior is normal.       Pelvic ultrasound shows the uterus is retroverted and measures 6 x 3.8 x 3.5 cm.  Endometrium is 3 mm.  Bilateral ovaries appear polycystic with multiple small follicles.  Both ovaries are normal size.  There are no large cyst or free fluid in the pelvis.    Imani was seen today for follow-up.    Diagnoses and all orders for this visit:    Subclinical hypothyroidism    Abnormal uterine bleeding (AUB)    Infertility, female    Elevated prolactin level (CMS/HCC)    We discussed Clomid.  Patient will start with her next cycle on day 3 and continue for 5 days.  She will check ovulation predictor kits.  They will call if she does not have positive ovulation to increase the dose.  She will continue her thyroid and prolactin medication.  They declined to go see the fertility specialist at this time.  We will try 4 months of Clomid.  We did discuss HSG.  Patient has no history of pelvic inflammatory disease.  We did discuss that pelvic inflammatory disease prior to surgery or endometriosis can cause disruption of the fallopian tube.  We discussed the " procedure for HSG and they declined at this time.    Patient will call with positive pregnancy test or follow up after 4 months of Clomid.  Continue prenatal vitamins.  15 minutes were spent in face-to-face consultation with the patient.

## 2020-02-11 ENCOUNTER — TELEPHONE (OUTPATIENT)
Dept: OBSTETRICS AND GYNECOLOGY | Age: 30
End: 2020-02-11

## 2020-02-11 NOTE — TELEPHONE ENCOUNTER
----- Message from Imani Ryan sent at 2/10/2020  5:38 PM EST -----  Regarding: Test Results Question  Contact: [phone]  Hi Doctor William,     On my latest test result, I saw the Vitamin B-12 1,167 pg/mL 211 - 946 pg/mL is out of the range. Should this be any concern? Is there anything dietary we need to take action?    Thanks!    Devika

## 2020-02-26 RX ORDER — CABERGOLINE 0.5 MG/1
TABLET ORAL
Qty: 6 TABLET | Refills: 3 | Status: SHIPPED | OUTPATIENT
Start: 2020-02-26 | End: 2020-09-25

## 2020-02-26 RX ORDER — LEVOTHYROXINE SODIUM 0.03 MG/1
25 TABLET ORAL DAILY
Qty: 30 TABLET | Refills: 10 | Status: SHIPPED | OUTPATIENT
Start: 2020-02-26 | End: 2020-09-28 | Stop reason: DRUGHIGH

## 2020-07-08 ENCOUNTER — TELEPHONE (OUTPATIENT)
Dept: OBSTETRICS AND GYNECOLOGY | Age: 30
End: 2020-07-08

## 2020-09-24 ENCOUNTER — TELEPHONE (OUTPATIENT)
Dept: OBSTETRICS AND GYNECOLOGY | Age: 30
End: 2020-09-24

## 2020-09-24 NOTE — TELEPHONE ENCOUNTER
(William pt) Pt called, received a positive pregnancy test.  Pt LMP 8/21/2020.   Pt scheduled NOB appt for 10/26/2020 @ 8:30 U/S & 9 am visit.   Pt mentioned she is having some mild cramping and denies bleeding.  On a scale of 1-10 with 10 being severe pain...the patient said she's at a 5.    Does pt need to be seen today for cramping?    Pt also quit taking her levothyroxine & cabergoline about a month ago.   Does she need to stay off these medications or should she start taking them again?      Please advise.    [phone]

## 2020-09-24 NOTE — TELEPHONE ENCOUNTER
It is very important for the patient to continue her levothyroxine.  She should stay off of her cabergolone.  Okay to work in for viability ultrasound due to the pain.

## 2020-09-25 ENCOUNTER — OFFICE VISIT (OUTPATIENT)
Dept: OBSTETRICS AND GYNECOLOGY | Age: 30
End: 2020-09-25

## 2020-09-25 ENCOUNTER — PROCEDURE VISIT (OUTPATIENT)
Dept: OBSTETRICS AND GYNECOLOGY | Age: 30
End: 2020-09-25

## 2020-09-25 DIAGNOSIS — Z34.90 PREGNANCY, UNSPECIFIED GESTATIONAL AGE: ICD-10-CM

## 2020-09-25 DIAGNOSIS — E03.8 SUBCLINICAL HYPOTHYROIDISM: Primary | ICD-10-CM

## 2020-09-25 DIAGNOSIS — R10.9 CRAMPING AFFECTING PREGNANCY, ANTEPARTUM: ICD-10-CM

## 2020-09-25 DIAGNOSIS — O26.899 CRAMPING AFFECTING PREGNANCY, ANTEPARTUM: ICD-10-CM

## 2020-09-25 DIAGNOSIS — O36.80X0 ENCOUNTER TO DETERMINE FETAL VIABILITY OF PREGNANCY, SINGLE OR UNSPECIFIED FETUS: Primary | ICD-10-CM

## 2020-09-25 PROCEDURE — 99213 OFFICE O/P EST LOW 20 MIN: CPT | Performed by: OBSTETRICS & GYNECOLOGY

## 2020-09-25 PROCEDURE — 76817 TRANSVAGINAL US OBSTETRIC: CPT | Performed by: OBSTETRICS & GYNECOLOGY

## 2020-09-25 NOTE — PROGRESS NOTES
Chief complaint-cramping and amenorrhea    History of present illness- Patient is a 30 y.o.  who complains of pelvic pressure and cramping.  Patient has taken 2 cycles of Clomid for ovulation induction and infertility.  She has had a positive home pregnancy test.  No vaginal bleeding.  Patient has subclinical hypothyroidism but stopped taking her thyroid medication.  She also has an elevated prolactin level for which she was taking Dostinex.  She recently stopped yesterday.  Patient is having some mild nausea and occasionally feeling lightheaded.      There were no vitals taken for this visit.  Physical Exam  Constitutional:       General: She is not in acute distress.     Appearance: She is normal weight.   Neurological:      Mental Status: She is alert.   Psychiatric:         Mood and Affect: Mood normal.         Thought Content: Thought content normal.         Judgment: Judgment normal.         Pelvic ultrasound shows there is a gestational sac that measures 5 weeks 5 days.  Yolk sac is seen but fetal pole is not clear yet.  No cardiac activity is seen there is fluid surrounding the right ovary.  Right ovary is larger than the left measuring 3 x 4 x 3 cm.    Diagnoses and all orders for this visit:    Subclinical hypothyroidism  -     OB Panel With HIV  -     TSH  -     Progesterone    Pregnancy, unspecified gestational age  -     OB Panel With HIV  -     TSH  -     Progesterone    The patient has had some pelvic cramping ultrasound today shows intrauterine pregnancy very early at about 5 weeks.  Fetal pole and cardiac activity are not completely seen so we will have the patient come back in 2 weeks for repeat ultrasound and new OB visit.  Hypothyroidism-patient stopped her medication and it is unclear why.  Her last TSH was 3.  We discussed ideal TSH range of 1-2.5.  We will check TSH today and adjust dose.  May likely need to increase dose.  Check OB labs.  Clomid pregnancy plata pregnancy is seen  with one gestational sac  Elevated prolactin level-patient achieved pregnancy with Dostinex and Clomid.    We discussed treatments for nausea and folder was given.    15 minutes were spent in face-to-face consultation with the patient and her .

## 2020-09-26 LAB
ABO GROUP BLD: ABNORMAL
BASOPHILS # BLD AUTO: 0 X10E3/UL (ref 0–0.2)
BASOPHILS NFR BLD AUTO: 0 %
BLD GP AB SCN SERPL QL: NEGATIVE
EOSINOPHIL # BLD AUTO: 0.1 X10E3/UL (ref 0–0.4)
EOSINOPHIL NFR BLD AUTO: 1 %
ERYTHROCYTE [DISTWIDTH] IN BLOOD BY AUTOMATED COUNT: 13.9 % (ref 11.7–15.4)
HBV SURFACE AG SERPL QL IA: NEGATIVE
HCT VFR BLD AUTO: 38.1 % (ref 34–46.6)
HCV AB S/CO SERPL IA: 0.1 S/CO RATIO (ref 0–0.9)
HGB BLD-MCNC: 12.4 G/DL (ref 11.1–15.9)
HIV 1+2 AB+HIV1 P24 AG SERPL QL IA: NON REACTIVE
IMM GRANULOCYTES # BLD AUTO: 0 X10E3/UL (ref 0–0.1)
IMM GRANULOCYTES NFR BLD AUTO: 0 %
LYMPHOCYTES # BLD AUTO: 2.2 X10E3/UL (ref 0.7–3.1)
LYMPHOCYTES NFR BLD AUTO: 17 %
MCH RBC QN AUTO: 29.2 PG (ref 26.6–33)
MCHC RBC AUTO-ENTMCNC: 32.5 G/DL (ref 31.5–35.7)
MCV RBC AUTO: 90 FL (ref 79–97)
MONOCYTES # BLD AUTO: 1.1 X10E3/UL (ref 0.1–0.9)
MONOCYTES NFR BLD AUTO: 9 %
NEUTROPHILS # BLD AUTO: 9.4 X10E3/UL (ref 1.4–7)
NEUTROPHILS NFR BLD AUTO: 73 %
PLATELET # BLD AUTO: 271 X10E3/UL (ref 150–450)
PROGEST SERPL-MCNC: 42 NG/ML
RBC # BLD AUTO: 4.24 X10E6/UL (ref 3.77–5.28)
RH BLD: POSITIVE
RPR SER QL: NON REACTIVE
RUBV IGG SERPL IA-ACNC: 7.29 INDEX
TSH SERPL DL<=0.005 MIU/L-ACNC: 6.39 UIU/ML (ref 0.27–4.2)
WBC # BLD AUTO: 12.8 X10E3/UL (ref 3.4–10.8)

## 2020-09-28 ENCOUNTER — TELEPHONE (OUTPATIENT)
Dept: OBSTETRICS AND GYNECOLOGY | Age: 30
End: 2020-09-28

## 2020-09-28 RX ORDER — LEVOTHYROXINE SODIUM 0.05 MG/1
50 TABLET ORAL DAILY
Qty: 30 TABLET | Refills: 2 | Status: SHIPPED | OUTPATIENT
Start: 2020-09-28 | End: 2021-01-04 | Stop reason: SDUPTHER

## 2020-09-28 NOTE — TELEPHONE ENCOUNTER
----- Message from Guille Thomas MD sent at 9/27/2020 11:43 AM EDT -----  Please notify that TSH is abnormal.  Patient needs to increase her Synthroid to 50 mcg daily please send in new dose.  Other labs are normal.  Progesterone is very good at 42.

## 2020-09-29 ENCOUNTER — TELEPHONE (OUTPATIENT)
Dept: OBSTETRICS AND GYNECOLOGY | Age: 30
End: 2020-09-29

## 2020-10-05 ENCOUNTER — PROCEDURE VISIT (OUTPATIENT)
Dept: OBSTETRICS AND GYNECOLOGY | Age: 30
End: 2020-10-05

## 2020-10-05 ENCOUNTER — INITIAL PRENATAL (OUTPATIENT)
Dept: OBSTETRICS AND GYNECOLOGY | Age: 30
End: 2020-10-05

## 2020-10-05 VITALS — WEIGHT: 101 LBS | DIASTOLIC BLOOD PRESSURE: 74 MMHG | SYSTOLIC BLOOD PRESSURE: 126 MMHG | BODY MASS INDEX: 19.73 KG/M2

## 2020-10-05 DIAGNOSIS — Z13.89 SCREENING FOR BLOOD OR PROTEIN IN URINE: Primary | ICD-10-CM

## 2020-10-05 DIAGNOSIS — O36.80X0 ENCOUNTER TO DETERMINE FETAL VIABILITY OF PREGNANCY, SINGLE OR UNSPECIFIED FETUS: Primary | ICD-10-CM

## 2020-10-05 DIAGNOSIS — Z11.3 SCREENING EXAMINATION FOR VENEREAL DISEASE: ICD-10-CM

## 2020-10-05 DIAGNOSIS — Z34.00 SUPERVISION OF NORMAL FIRST PREGNANCY, ANTEPARTUM: ICD-10-CM

## 2020-10-05 DIAGNOSIS — Z34.90 PREGNANCY, UNSPECIFIED GESTATIONAL AGE: ICD-10-CM

## 2020-10-05 LAB
BILIRUB BLD-MCNC: NEGATIVE MG/DL
CLARITY, POC: CLEAR
COLOR UR: YELLOW
GLUCOSE UR STRIP-MCNC: NEGATIVE MG/DL
KETONES UR QL: NEGATIVE
LEUKOCYTE EST, POC: NEGATIVE
NITRITE UR-MCNC: NEGATIVE MG/ML
PH UR: 6 [PH] (ref 5–8)
PROT UR STRIP-MCNC: NEGATIVE MG/DL
RBC # UR STRIP: ABNORMAL /UL
SP GR UR: 1.03 (ref 1–1.03)
UROBILINOGEN UR QL: NORMAL
VZV IGG SER QL: NORMAL

## 2020-10-05 PROCEDURE — 0501F PRENATAL FLOW SHEET: CPT | Performed by: OBSTETRICS & GYNECOLOGY

## 2020-10-05 PROCEDURE — 76817 TRANSVAGINAL US OBSTETRIC: CPT | Performed by: OBSTETRICS & GYNECOLOGY

## 2020-10-05 NOTE — PROGRESS NOTES
30-year-old  1 para 0 at 6 weeks 3 days by LMP consistent with ultrasound today.  Patient has had infertility and was on Clomid.  She also had an elevated prolactin level and was on medication.  She has hypothyroidism and had stopped her thyroid medication but just recently we checked a TSH and it was elevated.  I increased her thyroid dose to 50 mcg daily which she is taking.  She is taking prenatal vitamins.  Patient is here today with her .  She is Chinese but understands English well.  Her  does help however with translation.  Patient reports some epigastric discomfort.  She has not tried Tums yet.    Ultrasound shows viable intrauterine pregnancy with heart rate of 126.  There is some fluid near the right ovary.    Exam-see exam tab.    Assessment- 6 weeks  Pregnancy-new OB information was reviewed in detail.  We discussed genetic testing options.  Patient and her  desire cell free DNA and carrier testing.  They declined amniocentesis.  Hypothyroidism-continue Synthroid and recheck TSH in 4 weeks   heritage-check hemoglobin electrophoresis in 4 weeks  Recommend flu vaccination.  Patient declines today but states she will get it at next visit.  Fluid near the right ovary.  Suspect residual fluid from possible ruptured cyst.  Patient is not tender on exam.  Repeat ultrasound in 4 weeks.

## 2020-10-07 LAB
BACTERIA UR CULT: NORMAL
BACTERIA UR CULT: NORMAL
C TRACH RRNA SPEC QL NAA+PROBE: NEGATIVE
N GONORRHOEA RRNA SPEC QL NAA+PROBE: NEGATIVE

## 2020-11-02 ENCOUNTER — PROCEDURE VISIT (OUTPATIENT)
Dept: OBSTETRICS AND GYNECOLOGY | Age: 30
End: 2020-11-02

## 2020-11-02 ENCOUNTER — ROUTINE PRENATAL (OUTPATIENT)
Dept: OBSTETRICS AND GYNECOLOGY | Age: 30
End: 2020-11-02

## 2020-11-02 VITALS — SYSTOLIC BLOOD PRESSURE: 106 MMHG | DIASTOLIC BLOOD PRESSURE: 64 MMHG | WEIGHT: 101 LBS | BODY MASS INDEX: 19.73 KG/M2

## 2020-11-02 DIAGNOSIS — Z13.89 SCREENING FOR BLOOD OR PROTEIN IN URINE: Primary | ICD-10-CM

## 2020-11-02 DIAGNOSIS — Z34.90 PREGNANCY, UNSPECIFIED GESTATIONAL AGE: ICD-10-CM

## 2020-11-02 DIAGNOSIS — E03.8 SUBCLINICAL HYPOTHYROIDISM: ICD-10-CM

## 2020-11-02 DIAGNOSIS — K21.9 GASTROESOPHAGEAL REFLUX DISEASE WITHOUT ESOPHAGITIS: ICD-10-CM

## 2020-11-02 DIAGNOSIS — O36.80X0 ENCOUNTER TO DETERMINE FETAL VIABILITY OF PREGNANCY, SINGLE OR UNSPECIFIED FETUS: Primary | ICD-10-CM

## 2020-11-02 LAB
GLUCOSE UR STRIP-MCNC: NEGATIVE MG/DL
PROT UR STRIP-MCNC: NEGATIVE MG/DL

## 2020-11-02 PROCEDURE — 0502F SUBSEQUENT PRENATAL CARE: CPT | Performed by: OBSTETRICS & GYNECOLOGY

## 2020-11-02 PROCEDURE — 76817 TRANSVAGINAL US OBSTETRIC: CPT | Performed by: OBSTETRICS & GYNECOLOGY

## 2020-11-02 RX ORDER — FAMOTIDINE 20 MG/1
20 TABLET, FILM COATED ORAL DAILY
Qty: 30 TABLET | Refills: 11 | Status: SHIPPED | OUTPATIENT
Start: 2020-11-02 | End: 2020-12-22

## 2020-11-02 NOTE — PROGRESS NOTES
The patient is here today with her .  She complains of some reflux and some nausea vomiting.  She has tried Tums for the reflux.  She is also getting some leg cramps.  She has had more frequent bowel movements today.    Ultrasound shows viable intrauterine pregnancy.  The fluid that was around the ovary is now in the cul-de-sac and just measures 2 x 1 cm.  Approximately 3 cc.  Blood pressure 106/64  Weight is stable from last visit    Assessment-10 weeks  Reflex-we discussed options.  She will continue Tums and add Pepcid daily  Patient occasionally has pain in the epigastrium.  She will see if the Pepcid helps it.  Also encourage patient to avoid highly fatty foods  Hypothyroidism-recheck TSH today  Patient desires first trimester testing and carrier testing which will also be sent off today

## 2020-11-03 LAB
HGB A MFR BLD: 97.5 % (ref 96.4–98.8)
HGB A2 MFR BLD COLUMN CHROM: 2.5 % (ref 1.8–3.2)
HGB C MFR BLD: 0 %
HGB F MFR BLD: 0 % (ref 0–2)
HGB FRACT BLD-IMP: NORMAL
HGB S BLD QL SOLY: NEGATIVE
HGB S MFR BLD: 0 %
TSH SERPL DL<=0.005 MIU/L-ACNC: 1.61 UIU/ML (ref 0.27–4.2)

## 2020-11-04 ENCOUNTER — TELEPHONE (OUTPATIENT)
Dept: OBSTETRICS AND GYNECOLOGY | Age: 30
End: 2020-11-04

## 2020-11-04 NOTE — TELEPHONE ENCOUNTER
----- Message from Guille Thomas MD sent at 11/3/2020  4:51 PM EST -----  Please notify blood work is normal.

## 2020-11-11 ENCOUNTER — TELEPHONE (OUTPATIENT)
Dept: OBSTETRICS AND GYNECOLOGY | Age: 30
End: 2020-11-11

## 2020-11-11 NOTE — TELEPHONE ENCOUNTER
----- Message from Guille Thomas MD sent at 11/11/2020 12:25 PM EST -----  Please notify carrier testing is negative.

## 2020-11-12 ENCOUNTER — TELEPHONE (OUTPATIENT)
Dept: OBSTETRICS AND GYNECOLOGY | Age: 30
End: 2020-11-12

## 2020-11-12 NOTE — TELEPHONE ENCOUNTER
----- Message from Guille Thomas MD sent at 11/12/2020 12:24 PM EST -----  Notify first trimester testing is normal.  Notify of gender if the patient would like to know.

## 2020-12-03 ENCOUNTER — ROUTINE PRENATAL (OUTPATIENT)
Dept: OBSTETRICS AND GYNECOLOGY | Age: 30
End: 2020-12-03

## 2020-12-03 VITALS — BODY MASS INDEX: 19.33 KG/M2 | SYSTOLIC BLOOD PRESSURE: 102 MMHG | DIASTOLIC BLOOD PRESSURE: 64 MMHG | WEIGHT: 99 LBS

## 2020-12-03 DIAGNOSIS — Z13.89 SCREENING FOR BLOOD OR PROTEIN IN URINE: Primary | ICD-10-CM

## 2020-12-03 DIAGNOSIS — E03.8 SUBCLINICAL HYPOTHYROIDISM: ICD-10-CM

## 2020-12-03 DIAGNOSIS — Z34.90 PREGNANCY, UNSPECIFIED GESTATIONAL AGE: ICD-10-CM

## 2020-12-03 LAB
GLUCOSE UR STRIP-MCNC: NEGATIVE MG/DL
PROT UR STRIP-MCNC: NEGATIVE MG/DL

## 2020-12-03 PROCEDURE — 0502F SUBSEQUENT PRENATAL CARE: CPT | Performed by: OBSTETRICS & GYNECOLOGY

## 2020-12-03 NOTE — PROGRESS NOTES
Patient reports that her reflux is improved.  She is taking her Synthroid and vitamin.    First trimester testing was normal.  Baby is a boy.  Doppler tones are positive.  Weight loss of 2 pounds    Assessment-14 weeks  Follow-up in 3 to 4 weeks for AFP test.  Anatomy ultrasound will be scheduled.  Hypothyroidism-recheck TSH at next visit.

## 2020-12-22 ENCOUNTER — ROUTINE PRENATAL (OUTPATIENT)
Dept: OBSTETRICS AND GYNECOLOGY | Age: 30
End: 2020-12-22

## 2020-12-22 VITALS — SYSTOLIC BLOOD PRESSURE: 118 MMHG | WEIGHT: 102 LBS | BODY MASS INDEX: 19.92 KG/M2 | DIASTOLIC BLOOD PRESSURE: 74 MMHG

## 2020-12-22 DIAGNOSIS — Z34.90 PREGNANCY, UNSPECIFIED GESTATIONAL AGE: ICD-10-CM

## 2020-12-22 DIAGNOSIS — E03.8 SUBCLINICAL HYPOTHYROIDISM: ICD-10-CM

## 2020-12-22 DIAGNOSIS — Z13.89 SCREENING FOR BLOOD OR PROTEIN IN URINE: Primary | ICD-10-CM

## 2020-12-22 LAB
GLUCOSE UR STRIP-MCNC: NEGATIVE MG/DL
PROT UR STRIP-MCNC: NEGATIVE MG/DL

## 2020-12-22 PROCEDURE — 0502F SUBSEQUENT PRENATAL CARE: CPT | Performed by: OBSTETRICS & GYNECOLOGY

## 2020-12-22 NOTE — PROGRESS NOTES
Patient reports she is feeling well with no vaginal bleeding.  She will sometimes have emesis when she brushes her teeth.  On one occasion she had some loss of urine.  She wanted to know if she should start Kegel exercises.    Weight gain since last visit is 3 pounds.  Only 1 total pound weight gain for the pregnancy  Doppler heart tones are positive.  Abdomen is soft and nontender    Assessment-17 weeks  AFP test today  Hypothyroidism-recheck TSH  Anatomy ultrasound at next visit  We discussed Kegel exercises.

## 2020-12-24 ENCOUNTER — TELEPHONE (OUTPATIENT)
Dept: OBSTETRICS AND GYNECOLOGY | Age: 30
End: 2020-12-24

## 2020-12-24 LAB
AFP ADJ MOM SERPL: 1.09
AFP INTERP SERPL-IMP: NORMAL
AFP INTERP SERPL-IMP: NORMAL
AFP SERPL-MCNC: 67.1 NG/ML
AGE AT DELIVERY: 32.3 YR
GA METHOD: NORMAL
GA: 18.6 WEEKS
IDDM PATIENT QL: NO
LABORATORY COMMENT REPORT: NORMAL
MULTIPLE PREGNANCY: NO
NEURAL TUBE DEFECT RISK FETUS: 9231 %
RESULT: NORMAL
TSH SERPL DL<=0.005 MIU/L-ACNC: 1.97 UIU/ML (ref 0.45–4.5)

## 2020-12-24 NOTE — TELEPHONE ENCOUNTER
----- Message from Guille Thomas MD sent at 12/24/2020  9:03 AM EST -----  Please notify AFP is normal and TSH is also normal.

## 2021-01-04 RX ORDER — LEVOTHYROXINE SODIUM 0.05 MG/1
50 TABLET ORAL DAILY
Qty: 30 TABLET | Refills: 2 | Status: SHIPPED | OUTPATIENT
Start: 2021-01-04 | End: 2021-02-02 | Stop reason: SDUPTHER

## 2021-01-04 NOTE — TELEPHONE ENCOUNTER
Pt spouse calling stated that they had lab work done and wonder if this does needs to be adjusted?      Donato -   [phone]

## 2021-01-07 ENCOUNTER — ROUTINE PRENATAL (OUTPATIENT)
Dept: OBSTETRICS AND GYNECOLOGY | Age: 31
End: 2021-01-07

## 2021-01-07 VITALS — SYSTOLIC BLOOD PRESSURE: 106 MMHG | WEIGHT: 104 LBS | DIASTOLIC BLOOD PRESSURE: 74 MMHG | BODY MASS INDEX: 20.31 KG/M2

## 2021-01-07 DIAGNOSIS — Z13.89 SCREENING FOR BLOOD OR PROTEIN IN URINE: Primary | ICD-10-CM

## 2021-01-07 DIAGNOSIS — O44.40 LOW-LYING PLACENTA: ICD-10-CM

## 2021-01-07 DIAGNOSIS — Z34.90 PREGNANCY, UNSPECIFIED GESTATIONAL AGE: ICD-10-CM

## 2021-01-07 DIAGNOSIS — E03.8 SUBCLINICAL HYPOTHYROIDISM: ICD-10-CM

## 2021-01-07 LAB
GLUCOSE UR STRIP-MCNC: NEGATIVE MG/DL
PROT UR STRIP-MCNC: NEGATIVE MG/DL

## 2021-01-07 PROCEDURE — 0502F SUBSEQUENT PRENATAL CARE: CPT | Performed by: OBSTETRICS & GYNECOLOGY

## 2021-01-07 NOTE — PROGRESS NOTES
Patient is here today for anatomy ultrasound.  She is feeling fine with no complaints.    Weight gain of 2 pounds since last visit  Anatomy ultrasound shows normal anatomy except for low-lying posterior placenta.  Placenta is about 1.3 cm from the cervical os.  Size is greater than dates by 1 week.  Cervical length is normal at 3.2 cm.  Baby is a boy.    Assessment-20 weeks  Hypothyroidism-last TSH was normal.  Recheck at next visit  Low-lying placenta-recheck at next visit and relook at the heart since it will be larger but views today do appear normal.

## 2021-02-04 ENCOUNTER — ROUTINE PRENATAL (OUTPATIENT)
Dept: OBSTETRICS AND GYNECOLOGY | Age: 31
End: 2021-02-04

## 2021-02-04 VITALS — SYSTOLIC BLOOD PRESSURE: 108 MMHG | DIASTOLIC BLOOD PRESSURE: 64 MMHG | BODY MASS INDEX: 21.09 KG/M2 | WEIGHT: 108 LBS

## 2021-02-04 DIAGNOSIS — Z34.90 PREGNANCY, UNSPECIFIED GESTATIONAL AGE: ICD-10-CM

## 2021-02-04 DIAGNOSIS — Z13.89 SCREENING FOR BLOOD OR PROTEIN IN URINE: Primary | ICD-10-CM

## 2021-02-04 DIAGNOSIS — O44.40 LOW-LYING PLACENTA: ICD-10-CM

## 2021-02-04 DIAGNOSIS — E03.8 SUBCLINICAL HYPOTHYROIDISM: ICD-10-CM

## 2021-02-04 LAB
GLUCOSE UR STRIP-MCNC: NEGATIVE MG/DL
PROT UR STRIP-MCNC: NEGATIVE MG/DL

## 2021-02-04 PROCEDURE — 0502F SUBSEQUENT PRENATAL CARE: CPT | Performed by: OBSTETRICS & GYNECOLOGY

## 2021-02-04 RX ORDER — LEVOTHYROXINE SODIUM 0.05 MG/1
50 TABLET ORAL DAILY
Qty: 30 TABLET | Refills: 2 | Status: SHIPPED | OUTPATIENT
Start: 2021-02-04 | End: 2021-05-06 | Stop reason: SDUPTHER

## 2021-02-04 NOTE — PROGRESS NOTES
Patient is here today for ultrasound and visit.  She has some ligament pain on the right.  No vaginal bleeding or contractions.    Ultrasound shows resolution of low-lying placenta.  Placenta is now posterior 2.8 cm from the cervix.  Fundal height is appropriate and Doppler heart tones are positive  Ultrasound shows size greater than dates by 10 days  Weight gain for pregnancy is low at 7 pounds.    Assessment-24 weeks  Hypothyroidism-recheck TSH today  Low-lying placenta-resolved.  Discussed repeat ultrasound at 32 weeks to check weight  Third trimester labs today.  Blood type is B+  Tdap today

## 2021-02-05 ENCOUNTER — TELEPHONE (OUTPATIENT)
Dept: OBSTETRICS AND GYNECOLOGY | Age: 31
End: 2021-02-05

## 2021-02-05 LAB — TSH SERPL DL<=0.005 MIU/L-ACNC: 1.6 UIU/ML (ref 0.27–4.2)

## 2021-02-05 NOTE — TELEPHONE ENCOUNTER
----- Message from Guille Thomas MD sent at 2/5/2021 11:33 AM EST -----  Please notify TSH is normal.

## 2021-03-05 ENCOUNTER — ROUTINE PRENATAL (OUTPATIENT)
Dept: OBSTETRICS AND GYNECOLOGY | Age: 31
End: 2021-03-05

## 2021-03-05 VITALS — WEIGHT: 112 LBS | BODY MASS INDEX: 21.87 KG/M2 | DIASTOLIC BLOOD PRESSURE: 78 MMHG | SYSTOLIC BLOOD PRESSURE: 110 MMHG

## 2021-03-05 DIAGNOSIS — Z13.89 SCREENING FOR BLOOD OR PROTEIN IN URINE: Primary | ICD-10-CM

## 2021-03-05 DIAGNOSIS — Z13.0 SCREENING FOR IRON DEFICIENCY ANEMIA: ICD-10-CM

## 2021-03-05 DIAGNOSIS — Z34.03 ENCOUNTER FOR SUPERVISION OF NORMAL FIRST PREGNANCY IN THIRD TRIMESTER: ICD-10-CM

## 2021-03-05 DIAGNOSIS — E03.8 SUBCLINICAL HYPOTHYROIDISM: ICD-10-CM

## 2021-03-05 DIAGNOSIS — Z13.1 SCREENING FOR DIABETES MELLITUS: ICD-10-CM

## 2021-03-05 LAB
ERYTHROCYTE [DISTWIDTH] IN BLOOD BY AUTOMATED COUNT: 12.8 % (ref 12.3–15.4)
GLUCOSE 1H P 50 G GLC PO SERPL-MCNC: 99 MG/DL (ref 65–139)
GLUCOSE UR STRIP-MCNC: NEGATIVE MG/DL
HCT VFR BLD AUTO: 33.5 % (ref 34–46.6)
HGB BLD-MCNC: 11.3 G/DL (ref 12–15.9)
MCH RBC QN AUTO: 30.8 PG (ref 26.6–33)
MCHC RBC AUTO-ENTMCNC: 33.7 G/DL (ref 31.5–35.7)
MCV RBC AUTO: 91.3 FL (ref 79–97)
PLATELET # BLD AUTO: 227 10*3/MM3 (ref 140–450)
PROT UR STRIP-MCNC: NEGATIVE MG/DL
RBC # BLD AUTO: 3.67 10*6/MM3 (ref 3.77–5.28)
WBC # BLD AUTO: 13.26 10*3/MM3 (ref 3.4–10.8)

## 2021-03-05 PROCEDURE — 90471 IMMUNIZATION ADMIN: CPT | Performed by: OBSTETRICS & GYNECOLOGY

## 2021-03-05 PROCEDURE — 90715 TDAP VACCINE 7 YRS/> IM: CPT | Performed by: OBSTETRICS & GYNECOLOGY

## 2021-03-05 PROCEDURE — 0502F SUBSEQUENT PRENATAL CARE: CPT | Performed by: OBSTETRICS & GYNECOLOGY

## 2021-03-05 NOTE — PROGRESS NOTES
Patient complains of reflux.  She got some Nexium but has not started it yet.  Baby is moving well.  They have not picked at the pediatrician yet.    Doppler tones are positive.  Weight gain for pregnancy is low but patient did gain 4 pounds since her last visit  Fundal height is slightly low  Blood pressure 110/78 with no protein  TSH was normal 4 weeks ago    Assessment-28 weeks  Third trimester labs and Tdap today  Check TSH in 2 weeks  We discussed kick counting and pediatricians

## 2021-03-08 ENCOUNTER — TELEPHONE (OUTPATIENT)
Dept: OBSTETRICS AND GYNECOLOGY | Age: 31
End: 2021-03-08

## 2021-03-08 NOTE — TELEPHONE ENCOUNTER
----- Message from Guille Thomas MD sent at 3/7/2021 10:24 AM EST -----  Please notify 1 hour glucose tolerance test is normal.  Hemoglobin is slightly low at 11.  Recommend iron every other day.

## 2021-03-19 ENCOUNTER — ROUTINE PRENATAL (OUTPATIENT)
Dept: OBSTETRICS AND GYNECOLOGY | Age: 31
End: 2021-03-19

## 2021-03-19 VITALS — BODY MASS INDEX: 22.26 KG/M2 | DIASTOLIC BLOOD PRESSURE: 72 MMHG | SYSTOLIC BLOOD PRESSURE: 112 MMHG | WEIGHT: 114 LBS

## 2021-03-19 DIAGNOSIS — Z13.89 SCREENING FOR BLOOD OR PROTEIN IN URINE: Primary | ICD-10-CM

## 2021-03-19 DIAGNOSIS — D50.8 IRON DEFICIENCY ANEMIA SECONDARY TO INADEQUATE DIETARY IRON INTAKE: ICD-10-CM

## 2021-03-19 DIAGNOSIS — Z34.90 PREGNANCY, UNSPECIFIED GESTATIONAL AGE: ICD-10-CM

## 2021-03-19 DIAGNOSIS — E03.8 SUBCLINICAL HYPOTHYROIDISM: ICD-10-CM

## 2021-03-19 LAB
GLUCOSE UR STRIP-MCNC: NEGATIVE MG/DL
PROT UR STRIP-MCNC: NEGATIVE MG/DL

## 2021-03-19 PROCEDURE — 0502F SUBSEQUENT PRENATAL CARE: CPT | Performed by: OBSTETRICS & GYNECOLOGY

## 2021-03-19 RX ORDER — FERROUS SULFATE 325(65) MG
325 TABLET ORAL
Qty: 30 TABLET | Refills: 3 | Status: SHIPPED | OUTPATIENT
Start: 2021-03-19 | End: 2021-05-27

## 2021-03-19 NOTE — PROGRESS NOTES
The patient is doing better with Tums only.  She feels like she did not need the Nexium.  Baby is moving well.  Patient had mild anemia but did not start the iron yet.  They are still looking for pediatrician.  She plans on breast-feeding an epidural.  She plans circumcision for her son.    Weight gain of 2 pounds since last visit  Total weight gain for pregnancy is low  Fundal height is slightly low at 29  Blood pressure 112/72 with no protein    Assessment-30 weeks  Mild anemia with hemoglobin 11.3-I am prescribed we discussed the importance of iron.  Patient will take Colace to prevent constipation  Subclinical hypothyroidism-check TSH today  Fetal weight ultrasound in 2 weeks due to low maternal weight gain.

## 2021-03-20 LAB — TSH SERPL DL<=0.005 MIU/L-ACNC: 1.78 UIU/ML (ref 0.27–4.2)

## 2021-03-22 ENCOUNTER — TELEPHONE (OUTPATIENT)
Dept: OBSTETRICS AND GYNECOLOGY | Age: 31
End: 2021-03-22

## 2021-03-22 NOTE — TELEPHONE ENCOUNTER
----- Message from Guille Thomas MD sent at 3/20/2021  9:57 AM EDT -----  Please notify TSH is normal.

## 2021-03-23 ENCOUNTER — PATIENT MESSAGE (OUTPATIENT)
Dept: OBSTETRICS AND GYNECOLOGY | Age: 31
End: 2021-03-23

## 2021-03-23 NOTE — TELEPHONE ENCOUNTER
Please notify patient that it is safe to use Preparation H.  She could use the cream or the suppository.  She could also use Tucks pads.  She should start on Colace twice a day as a stool softener to prevent worsening.

## 2021-04-01 ENCOUNTER — ROUTINE PRENATAL (OUTPATIENT)
Dept: OBSTETRICS AND GYNECOLOGY | Age: 31
End: 2021-04-01

## 2021-04-01 VITALS — WEIGHT: 117 LBS | SYSTOLIC BLOOD PRESSURE: 124 MMHG | BODY MASS INDEX: 22.85 KG/M2 | DIASTOLIC BLOOD PRESSURE: 80 MMHG

## 2021-04-01 DIAGNOSIS — Z34.90 PREGNANCY, UNSPECIFIED GESTATIONAL AGE: ICD-10-CM

## 2021-04-01 DIAGNOSIS — E03.8 SUBCLINICAL HYPOTHYROIDISM: ICD-10-CM

## 2021-04-01 DIAGNOSIS — Z13.89 SCREENING FOR BLOOD OR PROTEIN IN URINE: Primary | ICD-10-CM

## 2021-04-01 DIAGNOSIS — D50.8 IRON DEFICIENCY ANEMIA SECONDARY TO INADEQUATE DIETARY IRON INTAKE: ICD-10-CM

## 2021-04-01 LAB
GLUCOSE UR STRIP-MCNC: NEGATIVE MG/DL
PROT UR STRIP-MCNC: NEGATIVE MG/DL

## 2021-04-01 PROCEDURE — 0502F SUBSEQUENT PRENATAL CARE: CPT | Performed by: OBSTETRICS & GYNECOLOGY

## 2021-04-01 NOTE — PROGRESS NOTES
The patient has had some mild nausea.  She is not tolerating the oral iron very well.  Baby is moving well.  No contractions or vaginal bleeding.    Ultrasound for fetal weight shows estimated fetal weight at the 47th percentile.  Abdominal circumference at the 31st percentile.  Baby is vertex.  INDY is normal at 15.  Blood pressure 124/80 with no protein.  3 pound weight gain since last visit.    Assessment-31 weeks 6 days  Normal weight on ultrasound today at the 47th percentile.  Patient is of small stature.  Mild anemia-patient is going to try to take the iron every other day.  She will try Tums for some stomach upset  Subclinical hypothyroidism-TSH was normal.

## 2021-04-14 ENCOUNTER — ROUTINE PRENATAL (OUTPATIENT)
Dept: OBSTETRICS AND GYNECOLOGY | Age: 31
End: 2021-04-14

## 2021-04-14 VITALS — SYSTOLIC BLOOD PRESSURE: 108 MMHG | DIASTOLIC BLOOD PRESSURE: 74 MMHG | WEIGHT: 119 LBS | BODY MASS INDEX: 23.24 KG/M2

## 2021-04-14 DIAGNOSIS — E03.8 SUBCLINICAL HYPOTHYROIDISM: ICD-10-CM

## 2021-04-14 DIAGNOSIS — Z13.89 SCREENING FOR BLOOD OR PROTEIN IN URINE: Primary | ICD-10-CM

## 2021-04-14 DIAGNOSIS — D50.8 IRON DEFICIENCY ANEMIA SECONDARY TO INADEQUATE DIETARY IRON INTAKE: ICD-10-CM

## 2021-04-14 LAB
GLUCOSE UR STRIP-MCNC: NEGATIVE MG/DL
PROT UR STRIP-MCNC: NEGATIVE MG/DL

## 2021-04-14 PROCEDURE — 0502F SUBSEQUENT PRENATAL CARE: CPT | Performed by: OBSTETRICS & GYNECOLOGY

## 2021-04-14 NOTE — PROGRESS NOTES
Patient reported that she had one episode of wetness in her underwear but it did not recur.  She has had no further episodes.  No bleeding and baby is moving well.  They wanted to ask about cord blood banking.    Blood pressure 108/74 with no protein  Doppler heart tones are positive.  Fetal movement is visible externally.  Fundal height is slightly low at 32 cm  Weight at last visit was normal at the 47th percentile.    Assessment-33 weeks  Slightly low fundal height but normal weight on ultrasound last visit.  Patient is petite.  Mild anemia-continue iron every other day  Subclinical hypothyroidism with normal TSH  Follow-up in 2 weeks.

## 2021-04-16 ENCOUNTER — BULK ORDERING (OUTPATIENT)
Dept: CASE MANAGEMENT | Facility: OTHER | Age: 31
End: 2021-04-16

## 2021-04-16 DIAGNOSIS — Z23 IMMUNIZATION DUE: ICD-10-CM

## 2021-04-29 ENCOUNTER — ROUTINE PRENATAL (OUTPATIENT)
Dept: OBSTETRICS AND GYNECOLOGY | Age: 31
End: 2021-04-29

## 2021-04-29 VITALS — SYSTOLIC BLOOD PRESSURE: 108 MMHG | DIASTOLIC BLOOD PRESSURE: 66 MMHG | BODY MASS INDEX: 23.44 KG/M2 | WEIGHT: 120 LBS

## 2021-04-29 DIAGNOSIS — Z36.85 ANTENATAL SCREENING FOR STREPTOCOCCUS B: Primary | ICD-10-CM

## 2021-04-29 DIAGNOSIS — E03.8 SUBCLINICAL HYPOTHYROIDISM: ICD-10-CM

## 2021-04-29 DIAGNOSIS — D50.8 IRON DEFICIENCY ANEMIA SECONDARY TO INADEQUATE DIETARY IRON INTAKE: ICD-10-CM

## 2021-04-29 DIAGNOSIS — Z34.90 PREGNANCY, UNSPECIFIED GESTATIONAL AGE: ICD-10-CM

## 2021-04-29 DIAGNOSIS — Z13.89 SCREENING FOR BLOOD OR PROTEIN IN URINE: ICD-10-CM

## 2021-04-29 LAB
GLUCOSE UR STRIP-MCNC: NEGATIVE MG/DL
PROT UR STRIP-MCNC: NEGATIVE MG/DL

## 2021-04-29 PROCEDURE — 0502F SUBSEQUENT PRENATAL CARE: CPT | Performed by: OBSTETRICS & GYNECOLOGY

## 2021-04-29 NOTE — PROGRESS NOTES
The patient is feeling good fetal movements.  She wanted to know if she needs to wear a mask during labor.  No contractions or bleeding.    Blood pressure 108/66 with no protein  Group B strep swab is collected  Cervix is closed and posterior.  Fundal height is slightly low.  Doppler tones are positive.    Assessment-35 weeks 6 days  Normal weight on last ultrasound  Mild anemia-continue iron every other day  Hypothyroidism-continue Synthroid  We discussed GBS.  Will check sensitivities.  Patient has history of rash with penicillin as a child but her mother is not entirely sure.  Follow-up weekly and recommend kick counts.

## 2021-05-01 LAB — GP B STREP DNA SPEC QL NAA+PROBE: NEGATIVE

## 2021-05-03 ENCOUNTER — TELEPHONE (OUTPATIENT)
Dept: OBSTETRICS AND GYNECOLOGY | Age: 31
End: 2021-05-03

## 2021-05-03 NOTE — TELEPHONE ENCOUNTER
----- Message from Guille Thomas MD sent at 5/2/2021  3:55 PM EDT -----  Please notify GBS is negative.

## 2021-05-04 RX ORDER — LEVOTHYROXINE SODIUM 0.05 MG/1
50 TABLET ORAL DAILY
Qty: 30 TABLET | Refills: 2 | Status: CANCELLED | OUTPATIENT
Start: 2021-05-04

## 2021-05-06 ENCOUNTER — ROUTINE PRENATAL (OUTPATIENT)
Dept: OBSTETRICS AND GYNECOLOGY | Age: 31
End: 2021-05-06

## 2021-05-06 VITALS — WEIGHT: 121 LBS | SYSTOLIC BLOOD PRESSURE: 108 MMHG | BODY MASS INDEX: 23.63 KG/M2 | DIASTOLIC BLOOD PRESSURE: 64 MMHG

## 2021-05-06 DIAGNOSIS — E03.8 SUBCLINICAL HYPOTHYROIDISM: ICD-10-CM

## 2021-05-06 DIAGNOSIS — D50.8 IRON DEFICIENCY ANEMIA SECONDARY TO INADEQUATE DIETARY IRON INTAKE: ICD-10-CM

## 2021-05-06 DIAGNOSIS — Z13.89 SCREENING FOR BLOOD OR PROTEIN IN URINE: Primary | ICD-10-CM

## 2021-05-06 DIAGNOSIS — Z34.90 PREGNANCY, UNSPECIFIED GESTATIONAL AGE: ICD-10-CM

## 2021-05-06 LAB
GLUCOSE UR STRIP-MCNC: NEGATIVE MG/DL
PROT UR STRIP-MCNC: NEGATIVE MG/DL

## 2021-05-06 PROCEDURE — 0502F SUBSEQUENT PRENATAL CARE: CPT | Performed by: OBSTETRICS & GYNECOLOGY

## 2021-05-06 RX ORDER — LEVOTHYROXINE SODIUM 0.05 MG/1
50 TABLET ORAL DAILY
Qty: 30 TABLET | Refills: 2 | Status: SHIPPED | OUTPATIENT
Start: 2021-05-06 | End: 2021-10-25

## 2021-05-06 NOTE — PROGRESS NOTES
Patient is feeling good fetal movements.  No regular contractions or vaginal bleeding.    Patient cervix is 1 cm 70% and -2 station.  Group B strep swab is negative  Fundal height is appropriate and Doppler heart tones are positive  Weight gain for pregnancy is 20 pounds  Blood pressure is normal.    Assessment-37 weeks  Discussed fetal movement counting and labor warnings given.  Follow-up weekly  Continue iron every other day  Continue Synthroid for hypothyroidism

## 2021-05-13 ENCOUNTER — ROUTINE PRENATAL (OUTPATIENT)
Dept: OBSTETRICS AND GYNECOLOGY | Age: 31
End: 2021-05-13

## 2021-05-13 VITALS — BODY MASS INDEX: 24.02 KG/M2 | SYSTOLIC BLOOD PRESSURE: 108 MMHG | DIASTOLIC BLOOD PRESSURE: 66 MMHG | WEIGHT: 123 LBS

## 2021-05-13 DIAGNOSIS — D50.8 IRON DEFICIENCY ANEMIA SECONDARY TO INADEQUATE DIETARY IRON INTAKE: ICD-10-CM

## 2021-05-13 DIAGNOSIS — Z13.89 SCREENING FOR BLOOD OR PROTEIN IN URINE: Primary | ICD-10-CM

## 2021-05-13 DIAGNOSIS — Z34.90 PREGNANCY, UNSPECIFIED GESTATIONAL AGE: ICD-10-CM

## 2021-05-13 DIAGNOSIS — E03.8 SUBCLINICAL HYPOTHYROIDISM: ICD-10-CM

## 2021-05-13 LAB
GLUCOSE UR STRIP-MCNC: NEGATIVE MG/DL
PROT UR STRIP-MCNC: NEGATIVE MG/DL

## 2021-05-13 PROCEDURE — 0502F SUBSEQUENT PRENATAL CARE: CPT | Performed by: OBSTETRICS & GYNECOLOGY

## 2021-05-13 NOTE — PROGRESS NOTES
Patient is feeling good fetal movements.  No regular contractions.  She does have her birth plan today that she wanted to go over.    Cervix is 2 cm and 70% effaced.  -2 station  Group B strep swab is negative  Blood pressure 108/66 with no protein  2 pound weight gain since last visit    Assessment-38 weeks  Discussed labor and reviewed birth plan.  Continue iron every other day  Continue Synthroid for hypothyroidism  Discussed possible induction of labor on May 22

## 2021-05-16 ENCOUNTER — HOSPITAL ENCOUNTER (EMERGENCY)
Facility: HOSPITAL | Age: 31
Discharge: HOME | End: 2021-05-17
Attending: OBSTETRICS & GYNECOLOGY | Admitting: STUDENT IN AN ORGANIZED HEALTH CARE EDUCATION/TRAINING PROGRAM

## 2021-05-16 PROCEDURE — 81001 URINALYSIS AUTO W/SCOPE: CPT | Performed by: STUDENT IN AN ORGANIZED HEALTH CARE EDUCATION/TRAINING PROGRAM

## 2021-05-17 VITALS
RESPIRATION RATE: 18 BRPM | BODY MASS INDEX: 23.87 KG/M2 | HEART RATE: 96 BPM | TEMPERATURE: 98.3 F | WEIGHT: 122.2 LBS | OXYGEN SATURATION: 100 % | SYSTOLIC BLOOD PRESSURE: 103 MMHG | DIASTOLIC BLOOD PRESSURE: 62 MMHG

## 2021-05-17 LAB
BACTERIA UR QL AUTO: NORMAL /HPF
BILIRUB UR QL STRIP: NEGATIVE
CLARITY UR: CLEAR
COLOR UR: YELLOW
GLUCOSE UR STRIP-MCNC: NEGATIVE MG/DL
HGB UR QL STRIP.AUTO: ABNORMAL
HYALINE CASTS UR QL AUTO: NORMAL /LPF
KETONES UR QL STRIP: NEGATIVE
LEUKOCYTE ESTERASE UR QL STRIP.AUTO: NEGATIVE
NITRITE UR QL STRIP: NEGATIVE
PH UR STRIP.AUTO: 7 [PH] (ref 5–8)
PROT UR QL STRIP: NEGATIVE
RBC # UR: NORMAL /HPF
REF LAB TEST METHOD: NORMAL
SP GR UR STRIP: 1.01 (ref 1–1.03)
SQUAMOUS #/AREA URNS HPF: NORMAL /HPF
UROBILINOGEN UR QL STRIP: ABNORMAL
WBC UR QL AUTO: NORMAL /HPF

## 2021-05-17 PROCEDURE — 25010000002 MORPHINE PER 10 MG: Performed by: STUDENT IN AN ORGANIZED HEALTH CARE EDUCATION/TRAINING PROGRAM

## 2021-05-17 PROCEDURE — 59025 FETAL NON-STRESS TEST: CPT

## 2021-05-17 PROCEDURE — 96372 THER/PROPH/DIAG INJ SC/IM: CPT

## 2021-05-17 PROCEDURE — 99284 EMERGENCY DEPT VISIT MOD MDM: CPT

## 2021-05-17 PROCEDURE — 63710000001 PROMETHAZINE PER 25 MG: Performed by: STUDENT IN AN ORGANIZED HEALTH CARE EDUCATION/TRAINING PROGRAM

## 2021-05-17 PROCEDURE — 99284 EMERGENCY DEPT VISIT MOD MDM: CPT | Performed by: STUDENT IN AN ORGANIZED HEALTH CARE EDUCATION/TRAINING PROGRAM

## 2021-05-17 PROCEDURE — 96372 THER/PROPH/DIAG INJ SC/IM: CPT | Performed by: STUDENT IN AN ORGANIZED HEALTH CARE EDUCATION/TRAINING PROGRAM

## 2021-05-17 RX ORDER — PROMETHAZINE HYDROCHLORIDE 25 MG/1
50 TABLET ORAL ONCE
Status: COMPLETED | OUTPATIENT
Start: 2021-05-17 | End: 2021-05-17

## 2021-05-17 RX ORDER — MORPHINE SULFATE 10 MG/ML
12 INJECTION INTRAMUSCULAR; INTRAVENOUS; SUBCUTANEOUS ONCE
Status: COMPLETED | OUTPATIENT
Start: 2021-05-17 | End: 2021-05-17

## 2021-05-17 RX ORDER — MORPHINE SULFATE 10 MG/ML
12 INJECTION INTRAMUSCULAR; INTRAVENOUS; SUBCUTANEOUS ONCE
Status: DISCONTINUED | OUTPATIENT
Start: 2021-05-17 | End: 2021-05-17

## 2021-05-17 RX ADMIN — MORPHINE SULFATE 12 MG: 10 INJECTION INTRAVENOUS at 01:59

## 2021-05-17 RX ADMIN — PROMETHAZINE HYDROCHLORIDE 50 MG: 25 TABLET ORAL at 01:43

## 2021-05-17 NOTE — NURSING NOTE
Rates contractions a 2 on a scale of 1-10.  Uterus soft to palpation.  Requesting to be discharged to home.   Instructed to return to Labor and Delivery with contractions that are 5 minutes apart for 1 hour and getting stronger, rupture of membranes, vaginal bleeding, increased pain or with decreased fetal movement.   verbalizes knowledge of home instructions.  Discharged via wheelchair, accompanied by .

## 2021-05-17 NOTE — NURSING NOTE
"Arrived to [facility] with complaints of contractions that started around 11-12 this am and bloody at 0700.  States contractions around 10 minutes apart and now is \"closer to 6 minutes\".  Baby active, + bloody show.  External fetal monitors explained and applied.  Vaginal exam done, 2cm/70%/-2.  " no indoor environmental allergies/no outdoor environmental allergies

## 2021-05-17 NOTE — NURSING NOTE
Dr. Mariscal informed of several late decelerations, and repositioned.   MD viewed tracing, and to go ahead and give Morphine 12mg IM and Phenergan 50mg po.

## 2021-05-17 NOTE — DISCHARGE INSTRUCTIONS
Discharged to home with instructions to keep next MD appointment, and to return to labor and delivery with complaints of contractions that are 5 minutes apart for 1 hour and are getting stronger, vaginal bleeding (informed that there might some vaginal spotting after tonight's cervical exam), increased pain, or decreased fetal movement.

## 2021-05-17 NOTE — OBED NOTES
[facility] EKTA Provider Note        2021 23:38 EDT    Imani Ryan is a 31 y.o.  at 38w2d CYNDI  2021, by Last Menstrual Period who presents for : No chief complaint on file.          HPI: Ms. Jaimee Ryan is a 31 year old primigravida at 38 weeks and 2 days who presents for labor check.  No VB/LOF/HA/visual changes/N/V/F/C/dysuria     Active fetus Yes   denies ROM  deniescontractions, cramping   denies bleeding    Prenatal care with Amanda Ag MD uncomplicated    Patient Active Problem List    Diagnosis    • Iron deficiency anemia secondary to inadequate dietary iron intake [D50.8]    • Pregnancy, unspecified gestational age [Z34.90]    • Infertility, female [N97.9]    • Subclinical hypothyroidism [E03.9]    • Elevated prolactin level [R79.89]    • Abnormal uterine bleeding (AUB) [N93.9]          POB:   OB History    Para Term  AB Living   1 0 0 0 0 0   SAB TAB Ectopic Molar Multiple Live Births   0 0 0 0 0 0      # Outcome Date GA Lbr Yair/2nd Weight Sex Delivery Anes PTL Lv   1 Current               PMH:   Past Medical History:   Diagnosis Date   • Disease of thyroid gland      PSH: No past surgical history on file.  FH:    Family History   Problem Relation Age of Onset   • Heart disease Maternal Grandmother    • No Known Problems Mother    • No Known Problems Father      SH:   Social History     Tobacco Use   • Smoking status: Never Smoker   • Smokeless tobacco: Never Used   Substance Use Topics   • Alcohol use: No   • Drug use: No       Allergies: Penicillins    Medications:   Medications Prior to Admission   Medication Sig Dispense Refill Last Dose   • ferrous sulfate 325 (65 FE) MG tablet Take 1 tablet by mouth Daily With Breakfast. 30 tablet 3    • levothyroxine (Synthroid) 50 MCG tablet Take 1 tablet by mouth Daily. 30 tablet 2    • Prenatal Multivit-Min-Fe-FA (PRE-ANAHI PO) Take  by mouth.      • Probiotic Product (PROBIOTIC-10 PO) Take  by mouth.          Review of  "Systems  A 14 system review was completed and negative except for what is noted in the HPI or below  Pertinent items are noted in HPI      Physical exam    Temp:  [98.3 °F (36.8 °C)] 98.3 °F (36.8 °C)  Estimated body mass index is 23.87 kg/m² as calculated from the following:    Height as of 2/3/20: 152.4 cm (60\").    Weight as of this encounter: 55.4 kg (122 lb 3.2 oz).    General appearance - alert, well appearing, and in no distress  Mental status - alert, oriented to person, place, and time  Musculoskeletal - no joint tenderness, deformity or swelling  Cervix: 2/long/high         Membranes: Intact                      FHR: 130's moderate variability, + accels, no decels cat 1  CTX: q 4 minutes     U/S reviewed: not performed.       External Prenatal Results     Pregnancy Outside Results - Transcribed From Office Records - See Scanned Records For Details     Test Value Date Time    Hgb  11.3 g/dL 03/05/21 1123       12.4 g/dL 09/25/20 1605    Hct  33.5 % 03/05/21 1123       38.1 % 09/25/20 1605    ABO  B  09/25/20 1605    Rh  Positive  09/25/20 1605    Antibody Screen  Negative  09/25/20 1605    Glucose Fasting GTT       Glucose Tolerance Test 1 hour       Glucose Tolerance Test 3 hour       Gonorrhea (discrete)  Negative  10/05/20 1006    Chlamydia (discrete)  Negative  10/05/20 1006    RPR  Non Reactive  09/25/20 1605    VDRL       Syphilis Antibody       Rubella  7.29 index 09/25/20 1605    HBsAg  Negative  09/25/20 1605    Herpes Simplex Virus PCR       Herpes Simplex VIrus Culture       HIV  Non Reactive  09/25/20 1605    Hep C RNA Quant PCR       Hep C Antibody  0.1 s/co ratio 09/25/20 1605    AFP  67.1 ng/mL 12/22/20 1210    Group B Strep  Negative  04/29/21 1554    GBS Susceptibility to Clindamycin       GBS Susceptibility to Erythromycin       Fetal Fibronectin       Genetic Testing, Maternal Blood             Drug Screening     Test Value Date Time    Urine Drug Screen       Amphetamine Screen       " Barbiturate Screen       Benzodiazepine Screen       Methadone Screen       Phencyclidine Screen       Opiates Screen       THC Screen       Cocaine Screen       Propoxyphene Screen       Buprenorphine Screen       Methamphetamine Screen       Oxycodone Screen       Tricyclic Antidepressants Screen             Legend    ^: Historical                          Impression:   @ 38w2d .  Final Diagnosis: Contractions    Plan:  1. Discharge to home.    2. Plan of care has been reviewed with patient and partner  3.  Risks, benefits of treatment plan have been discussed.  4.  All questions have been answered.  5.  Cervical exam unchanged after subsequent exams greater than one hour apart.  Contraction pattern also appears to be spacing.  Discussed therapeutic morphine sleep.  Requests information on safety of phenergan in pregnancy which was provided.   Patient elected to proceed with morphine sleep.  Morphine 12 mg IM  Phenergan 50 mg PO administered  Fetal status reassuring  Discharged home labor precautions given.    I discussed the patients findings and my recommendations with patient      Jay Mariscal MD  2021  23:38 EDT

## 2021-05-18 ENCOUNTER — ANESTHESIA (OUTPATIENT)
Dept: LABOR AND DELIVERY | Facility: HOSPITAL | Age: 31
End: 2021-05-18

## 2021-05-18 ENCOUNTER — ANESTHESIA EVENT (OUTPATIENT)
Dept: LABOR AND DELIVERY | Facility: HOSPITAL | Age: 31
End: 2021-05-18

## 2021-05-18 ENCOUNTER — HOSPITAL ENCOUNTER (INPATIENT)
Facility: HOSPITAL | Age: 31
LOS: 3 days | Discharge: HOME | End: 2021-05-21
Attending: OBSTETRICS & GYNECOLOGY | Admitting: OBSTETRICS & GYNECOLOGY

## 2021-05-18 ENCOUNTER — ROUTINE PRENATAL (OUTPATIENT)
Dept: OBSTETRICS AND GYNECOLOGY | Age: 31
End: 2021-05-18

## 2021-05-18 ENCOUNTER — TELEPHONE (OUTPATIENT)
Dept: OBSTETRICS AND GYNECOLOGY | Age: 31
End: 2021-05-18

## 2021-05-18 VITALS — SYSTOLIC BLOOD PRESSURE: 120 MMHG | WEIGHT: 122 LBS | BODY MASS INDEX: 23.83 KG/M2 | DIASTOLIC BLOOD PRESSURE: 84 MMHG

## 2021-05-18 DIAGNOSIS — D50.8 IRON DEFICIENCY ANEMIA SECONDARY TO INADEQUATE DIETARY IRON INTAKE: ICD-10-CM

## 2021-05-18 DIAGNOSIS — Z34.90 PREGNANCY, UNSPECIFIED GESTATIONAL AGE: ICD-10-CM

## 2021-05-18 DIAGNOSIS — Z13.89 SCREENING FOR BLOOD OR PROTEIN IN URINE: Primary | ICD-10-CM

## 2021-05-18 DIAGNOSIS — E03.8 SUBCLINICAL HYPOTHYROIDISM: ICD-10-CM

## 2021-05-18 LAB
ABO GROUP BLD: NORMAL
APTT PPP: 28.2 SECONDS (ref 22.7–35.4)
APTT PPP: 54.1 SECONDS (ref 22.7–35.4)
BASOPHILS # BLD AUTO: 0.02 10*3/MM3 (ref 0–0.2)
BASOPHILS # BLD AUTO: 0.03 10*3/MM3 (ref 0–0.2)
BASOPHILS # BLD AUTO: 0.06 10*3/MM3 (ref 0–0.2)
BASOPHILS NFR BLD AUTO: 0.1 % (ref 0–1.5)
BASOPHILS NFR BLD AUTO: 0.2 % (ref 0–1.5)
BASOPHILS NFR BLD AUTO: 0.3 % (ref 0–1.5)
BLD GP AB SCN SERPL QL: NEGATIVE
DEPRECATED RDW RBC AUTO: 43.4 FL (ref 37–54)
DEPRECATED RDW RBC AUTO: 45.4 FL (ref 37–54)
DEPRECATED RDW RBC AUTO: 46.1 FL (ref 37–54)
DEPRECATED RDW RBC AUTO: 46.8 FL (ref 37–54)
EOSINOPHIL # BLD AUTO: 0.01 10*3/MM3 (ref 0–0.4)
EOSINOPHIL # BLD AUTO: 0.02 10*3/MM3 (ref 0–0.4)
EOSINOPHIL # BLD AUTO: 0.06 10*3/MM3 (ref 0–0.4)
EOSINOPHIL NFR BLD AUTO: 0 % (ref 0.3–6.2)
EOSINOPHIL NFR BLD AUTO: 0.1 % (ref 0.3–6.2)
EOSINOPHIL NFR BLD AUTO: 0.4 % (ref 0.3–6.2)
ERYTHROCYTE [DISTWIDTH] IN BLOOD BY AUTOMATED COUNT: 13.3 % (ref 12.3–15.4)
ERYTHROCYTE [DISTWIDTH] IN BLOOD BY AUTOMATED COUNT: 13.4 % (ref 12.3–15.4)
ERYTHROCYTE [DISTWIDTH] IN BLOOD BY AUTOMATED COUNT: 13.5 % (ref 12.3–15.4)
ERYTHROCYTE [DISTWIDTH] IN BLOOD BY AUTOMATED COUNT: 13.7 % (ref 12.3–15.4)
FIBRINOGEN PPP-MCNC: 163 MG/DL (ref 219–464)
FIBRINOGEN PPP-MCNC: 409 MG/DL (ref 219–464)
GLUCOSE BLDC GLUCOMTR-MCNC: 70 MG/DL (ref 70–130)
GLUCOSE BLDC GLUCOMTR-MCNC: 84 MG/DL (ref 70–130)
GLUCOSE UR STRIP-MCNC: NEGATIVE MG/DL
HCT VFR BLD AUTO: 18.6 % (ref 34–46.6)
HCT VFR BLD AUTO: 25.4 % (ref 34–46.6)
HCT VFR BLD AUTO: 31.2 % (ref 34–46.6)
HCT VFR BLD AUTO: 43.1 % (ref 34–46.6)
HGB BLD-MCNC: 10.3 G/DL (ref 12–15.9)
HGB BLD-MCNC: 14.2 G/DL (ref 12–15.9)
HGB BLD-MCNC: 5.9 G/DL (ref 12–15.9)
HGB BLD-MCNC: 8.4 G/DL (ref 12–15.9)
IMM GRANULOCYTES # BLD AUTO: 0.15 10*3/MM3 (ref 0–0.05)
IMM GRANULOCYTES # BLD AUTO: 0.16 10*3/MM3 (ref 0–0.05)
IMM GRANULOCYTES # BLD AUTO: 0.3 10*3/MM3 (ref 0–0.05)
IMM GRANULOCYTES NFR BLD AUTO: 1 % (ref 0–0.5)
IMM GRANULOCYTES NFR BLD AUTO: 1.1 % (ref 0–0.5)
IMM GRANULOCYTES NFR BLD AUTO: 1.4 % (ref 0–0.5)
INR PPP: 1.12 (ref 0.9–1.1)
INR PPP: 2.15 (ref 0.9–1.1)
LYMPHOCYTES # BLD AUTO: 1.63 10*3/MM3 (ref 0.7–3.1)
LYMPHOCYTES # BLD AUTO: 1.72 10*3/MM3 (ref 0.7–3.1)
LYMPHOCYTES # BLD AUTO: 1.96 10*3/MM3 (ref 0.7–3.1)
LYMPHOCYTES NFR BLD AUTO: 11.7 % (ref 19.6–45.3)
LYMPHOCYTES NFR BLD AUTO: 12.4 % (ref 19.6–45.3)
LYMPHOCYTES NFR BLD AUTO: 8.2 % (ref 19.6–45.3)
MCH RBC QN AUTO: 30.4 PG (ref 26.6–33)
MCH RBC QN AUTO: 30.4 PG (ref 26.6–33)
MCH RBC QN AUTO: 30.8 PG (ref 26.6–33)
MCH RBC QN AUTO: 31.4 PG (ref 26.6–33)
MCHC RBC AUTO-ENTMCNC: 31.7 G/DL (ref 31.5–35.7)
MCHC RBC AUTO-ENTMCNC: 32.9 G/DL (ref 31.5–35.7)
MCHC RBC AUTO-ENTMCNC: 33 G/DL (ref 31.5–35.7)
MCHC RBC AUTO-ENTMCNC: 33.1 G/DL (ref 31.5–35.7)
MCV RBC AUTO: 92 FL (ref 79–97)
MCV RBC AUTO: 93.5 FL (ref 79–97)
MCV RBC AUTO: 95.1 FL (ref 79–97)
MCV RBC AUTO: 95.9 FL (ref 79–97)
MONOCYTES # BLD AUTO: 0.79 10*3/MM3 (ref 0.1–0.9)
MONOCYTES # BLD AUTO: 1.33 10*3/MM3 (ref 0.1–0.9)
MONOCYTES # BLD AUTO: 1.83 10*3/MM3 (ref 0.1–0.9)
MONOCYTES NFR BLD AUTO: 5.7 % (ref 5–12)
MONOCYTES NFR BLD AUTO: 8.4 % (ref 5–12)
MONOCYTES NFR BLD AUTO: 8.7 % (ref 5–12)
NEUTROPHILS NFR BLD AUTO: 11.24 10*3/MM3 (ref 1.7–7)
NEUTROPHILS NFR BLD AUTO: 12.35 10*3/MM3 (ref 1.7–7)
NEUTROPHILS NFR BLD AUTO: 17.09 10*3/MM3 (ref 1.7–7)
NEUTROPHILS NFR BLD AUTO: 78 % (ref 42.7–76)
NEUTROPHILS NFR BLD AUTO: 80.9 % (ref 42.7–76)
NEUTROPHILS NFR BLD AUTO: 81.4 % (ref 42.7–76)
NRBC BLD AUTO-RTO: 0 /100 WBC (ref 0–0.2)
PLATELET # BLD AUTO: 108 10*3/MM3 (ref 140–450)
PLATELET # BLD AUTO: 139 10*3/MM3 (ref 140–450)
PLATELET # BLD AUTO: 166 10*3/MM3 (ref 140–450)
PLATELET # BLD AUTO: 191 10*3/MM3 (ref 140–450)
PMV BLD AUTO: 10.1 FL (ref 6–12)
PMV BLD AUTO: 10.5 FL (ref 6–12)
PMV BLD AUTO: 10.5 FL (ref 6–12)
PMV BLD AUTO: 10.6 FL (ref 6–12)
PROT UR STRIP-MCNC: NEGATIVE MG/DL
PROTHROMBIN TIME: 14.2 SECONDS (ref 11.7–14.2)
PROTHROMBIN TIME: 23.7 SECONDS (ref 11.7–14.2)
RBC # BLD AUTO: 1.94 10*6/MM3 (ref 3.77–5.28)
RBC # BLD AUTO: 2.76 10*6/MM3 (ref 3.77–5.28)
RBC # BLD AUTO: 3.28 10*6/MM3 (ref 3.77–5.28)
RBC # BLD AUTO: 4.61 10*6/MM3 (ref 3.77–5.28)
RH BLD: POSITIVE
SARS-COV-2 RNA PNL SPEC NAA+PROBE: NOT DETECTED
T&S EXPIRATION DATE: NORMAL
WBC # BLD AUTO: 12.15 10*3/MM3 (ref 3.4–10.8)
WBC # BLD AUTO: 13.9 10*3/MM3 (ref 3.4–10.8)
WBC # BLD AUTO: 15.84 10*3/MM3 (ref 3.4–10.8)
WBC # BLD AUTO: 21.01 10*3/MM3 (ref 3.4–10.8)

## 2021-05-18 PROCEDURE — 86850 RBC ANTIBODY SCREEN: CPT | Performed by: OBSTETRICS & GYNECOLOGY

## 2021-05-18 PROCEDURE — C1755 CATHETER, INTRASPINAL: HCPCS | Performed by: ANESTHESIOLOGY

## 2021-05-18 PROCEDURE — 10907ZC DRAINAGE OF AMNIOTIC FLUID, THERAPEUTIC FROM PRODUCTS OF CONCEPTION, VIA NATURAL OR ARTIFICIAL OPENING: ICD-10-PCS | Performed by: OBSTETRICS & GYNECOLOGY

## 2021-05-18 PROCEDURE — 85730 THROMBOPLASTIN TIME PARTIAL: CPT | Performed by: OBSTETRICS & GYNECOLOGY

## 2021-05-18 PROCEDURE — 85384 FIBRINOGEN ACTIVITY: CPT | Performed by: OBSTETRICS & GYNECOLOGY

## 2021-05-18 PROCEDURE — 86901 BLOOD TYPING SEROLOGIC RH(D): CPT

## 2021-05-18 PROCEDURE — 85025 COMPLETE CBC W/AUTO DIFF WBC: CPT | Performed by: OBSTETRICS & GYNECOLOGY

## 2021-05-18 PROCEDURE — 12044 INTMD RPR N-HF/GENIT7.6-12.5: CPT | Performed by: OBSTETRICS & GYNECOLOGY

## 2021-05-18 PROCEDURE — 86900 BLOOD TYPING SEROLOGIC ABO: CPT | Performed by: OBSTETRICS & GYNECOLOGY

## 2021-05-18 PROCEDURE — 0UQMXZZ REPAIR VULVA, EXTERNAL APPROACH: ICD-10-PCS | Performed by: OBSTETRICS & GYNECOLOGY

## 2021-05-18 PROCEDURE — 85610 PROTHROMBIN TIME: CPT | Performed by: OBSTETRICS & GYNECOLOGY

## 2021-05-18 PROCEDURE — 82962 GLUCOSE BLOOD TEST: CPT

## 2021-05-18 PROCEDURE — 87635 SARS-COV-2 COVID-19 AMP PRB: CPT | Performed by: OBSTETRICS & GYNECOLOGY

## 2021-05-18 PROCEDURE — S0260 H&P FOR SURGERY: HCPCS | Performed by: OBSTETRICS & GYNECOLOGY

## 2021-05-18 PROCEDURE — 36430 TRANSFUSION BLD/BLD COMPNT: CPT

## 2021-05-18 PROCEDURE — 86900 BLOOD TYPING SEROLOGIC ABO: CPT

## 2021-05-18 PROCEDURE — 86923 COMPATIBILITY TEST ELECTRIC: CPT

## 2021-05-18 PROCEDURE — 0UQGXZZ REPAIR VAGINA, EXTERNAL APPROACH: ICD-10-PCS | Performed by: OBSTETRICS & GYNECOLOGY

## 2021-05-18 PROCEDURE — 85027 COMPLETE CBC AUTOMATED: CPT | Performed by: OBSTETRICS & GYNECOLOGY

## 2021-05-18 PROCEDURE — 59400 OBSTETRICAL CARE: CPT | Performed by: OBSTETRICS & GYNECOLOGY

## 2021-05-18 PROCEDURE — P9016 RBC LEUKOCYTES REDUCED: HCPCS

## 2021-05-18 PROCEDURE — 0DQP0ZZ REPAIR RECTUM, OPEN APPROACH: ICD-10-PCS | Performed by: OBSTETRICS & GYNECOLOGY

## 2021-05-18 PROCEDURE — C1755 CATHETER, INTRASPINAL: HCPCS

## 2021-05-18 PROCEDURE — 0502F SUBSEQUENT PRENATAL CARE: CPT | Performed by: OBSTETRICS & GYNECOLOGY

## 2021-05-18 PROCEDURE — 86901 BLOOD TYPING SEROLOGIC RH(D): CPT | Performed by: OBSTETRICS & GYNECOLOGY

## 2021-05-18 PROCEDURE — 25010000003 CEFAZOLIN IN DEXTROSE 2-4 GM/100ML-% SOLUTION: Performed by: OBSTETRICS & GYNECOLOGY

## 2021-05-18 RX ORDER — OXYCODONE HYDROCHLORIDE AND ACETAMINOPHEN 5; 325 MG/1; MG/1
1 TABLET ORAL EVERY 4 HOURS PRN
Status: DISCONTINUED | OUTPATIENT
Start: 2021-05-18 | End: 2021-05-21 | Stop reason: HOSPADM

## 2021-05-18 RX ORDER — CEFAZOLIN SODIUM 2 G/100ML
2 INJECTION, SOLUTION INTRAVENOUS ONCE
Status: COMPLETED | OUTPATIENT
Start: 2021-05-18 | End: 2021-05-18

## 2021-05-18 RX ORDER — LEVOTHYROXINE SODIUM 0.05 MG/1
50 TABLET ORAL
Status: DISCONTINUED | OUTPATIENT
Start: 2021-05-19 | End: 2021-05-21 | Stop reason: HOSPADM

## 2021-05-18 RX ORDER — MISOPROSTOL 200 UG/1
800 TABLET ORAL AS NEEDED
Status: DISCONTINUED | OUTPATIENT
Start: 2021-05-18 | End: 2021-05-18

## 2021-05-18 RX ORDER — DEXTROSE AND SODIUM CHLORIDE 5; .45 G/100ML; G/100ML
125 INJECTION, SOLUTION INTRAVENOUS CONTINUOUS
Status: DISCONTINUED | OUTPATIENT
Start: 2021-05-18 | End: 2021-05-18

## 2021-05-18 RX ORDER — PHYTONADIONE 1 MG/.5ML
INJECTION, EMULSION INTRAMUSCULAR; INTRAVENOUS; SUBCUTANEOUS
Status: DISPENSED
Start: 2021-05-18 | End: 2021-05-19

## 2021-05-18 RX ORDER — IBUPROFEN 600 MG/1
600 TABLET ORAL EVERY 8 HOURS PRN
Status: DISCONTINUED | OUTPATIENT
Start: 2021-05-18 | End: 2021-05-21 | Stop reason: HOSPADM

## 2021-05-18 RX ORDER — OXYTOCIN-SODIUM CHLORIDE 0.9% IV SOLN 30 UNIT/500ML 30-0.9/5 UT/ML-%
125 SOLUTION INTRAVENOUS CONTINUOUS PRN
Status: COMPLETED | OUTPATIENT
Start: 2021-05-18 | End: 2021-05-18

## 2021-05-18 RX ORDER — DOCUSATE SODIUM 100 MG/1
100 CAPSULE, LIQUID FILLED ORAL 2 TIMES DAILY
Status: DISCONTINUED | OUTPATIENT
Start: 2021-05-18 | End: 2021-05-21 | Stop reason: HOSPADM

## 2021-05-18 RX ORDER — ERYTHROMYCIN 5 MG/G
OINTMENT OPHTHALMIC
Status: DISPENSED
Start: 2021-05-18 | End: 2021-05-19

## 2021-05-18 RX ORDER — SODIUM CHLORIDE, SODIUM LACTATE, POTASSIUM CHLORIDE, CALCIUM CHLORIDE 600; 310; 30; 20 MG/100ML; MG/100ML; MG/100ML; MG/100ML
125 INJECTION, SOLUTION INTRAVENOUS CONTINUOUS
Status: DISCONTINUED | OUTPATIENT
Start: 2021-05-18 | End: 2021-05-18

## 2021-05-18 RX ORDER — FAMOTIDINE 10 MG/ML
20 INJECTION, SOLUTION INTRAVENOUS ONCE AS NEEDED
Status: DISCONTINUED | OUTPATIENT
Start: 2021-05-18 | End: 2021-05-18

## 2021-05-18 RX ORDER — OXYTOCIN-SODIUM CHLORIDE 0.9% IV SOLN 30 UNIT/500ML 30-0.9/5 UT/ML-%
250 SOLUTION INTRAVENOUS CONTINUOUS PRN
Status: DISPENSED | OUTPATIENT
Start: 2021-05-18 | End: 2021-05-18

## 2021-05-18 RX ORDER — SODIUM CHLORIDE 0.9 % (FLUSH) 0.9 %
10 SYRINGE (ML) INJECTION AS NEEDED
Status: DISCONTINUED | OUTPATIENT
Start: 2021-05-18 | End: 2021-05-18

## 2021-05-18 RX ORDER — BISACODYL 10 MG
10 SUPPOSITORY, RECTAL RECTAL DAILY PRN
Status: DISCONTINUED | OUTPATIENT
Start: 2021-05-19 | End: 2021-05-21 | Stop reason: HOSPADM

## 2021-05-18 RX ORDER — DIPHENHYDRAMINE HCL 25 MG
25 CAPSULE ORAL NIGHTLY PRN
Status: DISCONTINUED | OUTPATIENT
Start: 2021-05-18 | End: 2021-05-21 | Stop reason: HOSPADM

## 2021-05-18 RX ORDER — LIDOCAINE HYDROCHLORIDE AND EPINEPHRINE 15; 5 MG/ML; UG/ML
INJECTION, SOLUTION EPIDURAL AS NEEDED
Status: DISCONTINUED | OUTPATIENT
Start: 2021-05-18 | End: 2021-05-18 | Stop reason: SURG

## 2021-05-18 RX ORDER — EPHEDRINE SULFATE 50 MG/ML
5 INJECTION, SOLUTION INTRAVENOUS AS NEEDED
Status: DISCONTINUED | OUTPATIENT
Start: 2021-05-18 | End: 2021-05-18

## 2021-05-18 RX ORDER — MAGNESIUM CARB/ALUMINUM HYDROX 105-160MG
30 TABLET,CHEWABLE ORAL ONCE
Status: DISCONTINUED | OUTPATIENT
Start: 2021-05-18 | End: 2021-05-18

## 2021-05-18 RX ORDER — SIMETHICONE 80 MG
80 TABLET,CHEWABLE ORAL 4 TIMES DAILY PRN
Status: DISCONTINUED | OUTPATIENT
Start: 2021-05-18 | End: 2021-05-21 | Stop reason: HOSPADM

## 2021-05-18 RX ORDER — OXYTOCIN-SODIUM CHLORIDE 0.9% IV SOLN 30 UNIT/500ML 30-0.9/5 UT/ML-%
999 SOLUTION INTRAVENOUS ONCE
Status: COMPLETED | OUTPATIENT
Start: 2021-05-18 | End: 2021-05-18

## 2021-05-18 RX ORDER — SODIUM CHLORIDE 0.9 % (FLUSH) 0.9 %
3 SYRINGE (ML) INJECTION EVERY 12 HOURS SCHEDULED
Status: DISCONTINUED | OUTPATIENT
Start: 2021-05-18 | End: 2021-05-18

## 2021-05-18 RX ORDER — ONDANSETRON 2 MG/ML
4 INJECTION INTRAMUSCULAR; INTRAVENOUS EVERY 6 HOURS PRN
Status: DISCONTINUED | OUTPATIENT
Start: 2021-05-18 | End: 2021-05-21 | Stop reason: HOSPADM

## 2021-05-18 RX ORDER — HYDROCORTISONE 25 MG/G
1 CREAM TOPICAL AS NEEDED
Status: DISCONTINUED | OUTPATIENT
Start: 2021-05-18 | End: 2021-05-21 | Stop reason: HOSPADM

## 2021-05-18 RX ORDER — ONDANSETRON 2 MG/ML
4 INJECTION INTRAMUSCULAR; INTRAVENOUS ONCE AS NEEDED
Status: DISCONTINUED | OUTPATIENT
Start: 2021-05-18 | End: 2021-05-18

## 2021-05-18 RX ORDER — OXYCODONE HYDROCHLORIDE AND ACETAMINOPHEN 5; 325 MG/1; MG/1
2 TABLET ORAL EVERY 4 HOURS PRN
Status: DISCONTINUED | OUTPATIENT
Start: 2021-05-18 | End: 2021-05-21 | Stop reason: HOSPADM

## 2021-05-18 RX ORDER — METHYLERGONOVINE MALEATE 0.2 MG/ML
200 INJECTION INTRAVENOUS ONCE AS NEEDED
Status: DISCONTINUED | OUTPATIENT
Start: 2021-05-18 | End: 2021-05-18

## 2021-05-18 RX ORDER — DIPHENHYDRAMINE HYDROCHLORIDE 50 MG/ML
12.5 INJECTION INTRAMUSCULAR; INTRAVENOUS EVERY 8 HOURS PRN
Status: DISCONTINUED | OUTPATIENT
Start: 2021-05-18 | End: 2021-05-18

## 2021-05-18 RX ORDER — CARBOPROST TROMETHAMINE 250 UG/ML
250 INJECTION, SOLUTION INTRAMUSCULAR AS NEEDED
Status: DISCONTINUED | OUTPATIENT
Start: 2021-05-18 | End: 2021-05-18

## 2021-05-18 RX ADMIN — CEFAZOLIN SODIUM 2 G: 2 INJECTION, SOLUTION INTRAVENOUS at 17:54

## 2021-05-18 RX ADMIN — OXYTOCIN 999 ML/HR: 10 INJECTION, SOLUTION INTRAMUSCULAR; INTRAVENOUS at 17:21

## 2021-05-18 RX ADMIN — OXYTOCIN 250 ML/HR: 10 INJECTION, SOLUTION INTRAMUSCULAR; INTRAVENOUS at 18:00

## 2021-05-18 RX ADMIN — SODIUM CHLORIDE, POTASSIUM CHLORIDE, SODIUM LACTATE AND CALCIUM CHLORIDE 999 ML/HR: 600; 310; 30; 20 INJECTION, SOLUTION INTRAVENOUS at 17:47

## 2021-05-18 RX ADMIN — Medication 96 ML/HR: at 12:47

## 2021-05-18 RX ADMIN — DEXTROSE AND SODIUM CHLORIDE 125 ML/HR: 5; 450 INJECTION, SOLUTION INTRAVENOUS at 17:51

## 2021-05-18 RX ADMIN — LIDOCAINE HYDROCHLORIDE AND EPINEPHRINE 4 ML: 15; 5 INJECTION, SOLUTION EPIDURAL at 12:46

## 2021-05-18 RX ADMIN — SODIUM CHLORIDE, POTASSIUM CHLORIDE, SODIUM LACTATE AND CALCIUM CHLORIDE 999 ML/HR: 600; 310; 30; 20 INJECTION, SOLUTION INTRAVENOUS at 20:26

## 2021-05-18 RX ADMIN — SODIUM CHLORIDE, POTASSIUM CHLORIDE, SODIUM LACTATE AND CALCIUM CHLORIDE 125 ML/HR: 600; 310; 30; 20 INJECTION, SOLUTION INTRAVENOUS at 12:42

## 2021-05-18 RX ADMIN — SODIUM CHLORIDE, POTASSIUM CHLORIDE, SODIUM LACTATE AND CALCIUM CHLORIDE 1000 ML: 600; 310; 30; 20 INJECTION, SOLUTION INTRAVENOUS at 11:44

## 2021-05-18 NOTE — PROGRESS NOTES
Patient is complaining of contractions every 5 minutes that are painful.  Baby is moving well.    Cervix is significantly dilated at 6 to 7 cm 90% and 0 station.  Head is low in the pelvis.  Doppler heart tones are positive  Blood pressure 120/84 with no protein.    Assessment-active labor  Patient's  will take patient directly over to labor and delivery I have called them and then if she is coming.

## 2021-05-18 NOTE — TELEPHONE ENCOUNTER
Pt spouse calling, says pt is having a lot of contractions, went to L&D Sunday but was sent home and is having them today as well but more frequent, last night was 5 mins apart, do not last for 1 minuet, maybe 45 seconds.  Water has not broke, is having mucous discharge.  First child, 38 weeks 5 days, not on any medications to stop contractions, able to walk, 2 cm Sunday, delivering at [facility].    [phone] [place]

## 2021-05-18 NOTE — ANESTHESIA PREPROCEDURE EVALUATION
Anesthesia Evaluation     Patient summary reviewed   no history of anesthetic complications:               Airway   Mallampati: II  TM distance: >3 FB  Neck ROM: full  Dental      Pulmonary - normal exam   (-) not a smoker  Cardiovascular - normal exam    (-) angina      Neuro/Psych  GI/Hepatic/Renal/Endo      Musculoskeletal     Abdominal    Substance History      OB/GYN    (+) Pregnant (38W 4D),         Other          Other Comment: Hb 14.2 Plt 191K                  Anesthesia Plan    ASA 2     epidural       Anesthetic plan, all risks, benefits, and alternatives have been provided, discussed and informed consent has been obtained with: patient.

## 2021-05-18 NOTE — ANESTHESIA PROCEDURE NOTES
Labor Epidural      Patient reassessed immediately prior to procedure    Patient location during procedure: OB  Performed By  Anesthesiologist: Mann Marquez MD  Preanesthetic Checklist  Completed: patient identified, IV checked, site marked, risks and benefits discussed, surgical consent, monitors and equipment checked, pre-op evaluation and timeout performed  Prep:  Pt Position:sitting  Sterile Tech:gloves, mask and sterile barrier  Prep:chlorhexidine gluconate and isopropyl alcohol  Monitoring:blood pressure monitoring, continuous pulse oximetry and EKG  Epidural Block Procedure:  Approach:midline  Guidance:palpation technique  Location:L2-L3  Needle Type:Tuohy  Needle Gauge:17  Loss of Resistance Medium: saline  Loss of Resistance: 5cm  Cath Depth at skin:13 cm  Paresthesia: none  Aspiration:negative  Test Dose:negative  Number of Attempts: 1  Post Assessment:  Dressing:occlusive dressing applied and secured with tape  Pt Tolerance:patient tolerated the procedure well with no apparent complications

## 2021-05-19 PROBLEM — D62 ANEMIA DUE TO ACUTE BLOOD LOSS: Status: ACTIVE | Noted: 2021-05-19

## 2021-05-19 LAB
BASOPHILS # BLD AUTO: 0.06 10*3/MM3 (ref 0–0.2)
BASOPHILS NFR BLD AUTO: 0.3 % (ref 0–1.5)
BH BB BLOOD EXPIRATION DATE: NORMAL
BH BB BLOOD EXPIRATION DATE: NORMAL
BH BB BLOOD TYPE BARCODE: 1700
BH BB BLOOD TYPE BARCODE: 1700
BH BB DISPENSE STATUS: NORMAL
BH BB DISPENSE STATUS: NORMAL
BH BB PRODUCT CODE: NORMAL
BH BB PRODUCT CODE: NORMAL
BH BB UNIT NUMBER: NORMAL
BH BB UNIT NUMBER: NORMAL
CROSSMATCH INTERPRETATION: NORMAL
CROSSMATCH INTERPRETATION: NORMAL
DEPRECATED RDW RBC AUTO: 44.5 FL (ref 37–54)
EOSINOPHIL # BLD AUTO: 0.05 10*3/MM3 (ref 0–0.4)
EOSINOPHIL NFR BLD AUTO: 0.3 % (ref 0.3–6.2)
ERYTHROCYTE [DISTWIDTH] IN BLOOD BY AUTOMATED COUNT: 13.8 % (ref 12.3–15.4)
HCT VFR BLD AUTO: 28.4 % (ref 34–46.6)
HGB BLD-MCNC: 10 G/DL (ref 12–15.9)
IMM GRANULOCYTES # BLD AUTO: 0.29 10*3/MM3 (ref 0–0.05)
IMM GRANULOCYTES NFR BLD AUTO: 1.7 % (ref 0–0.5)
LYMPHOCYTES # BLD AUTO: 2 10*3/MM3 (ref 0.7–3.1)
LYMPHOCYTES NFR BLD AUTO: 11.5 % (ref 19.6–45.3)
MCH RBC QN AUTO: 31.7 PG (ref 26.6–33)
MCHC RBC AUTO-ENTMCNC: 35.2 G/DL (ref 31.5–35.7)
MCV RBC AUTO: 90.2 FL (ref 79–97)
MONOCYTES # BLD AUTO: 1.31 10*3/MM3 (ref 0.1–0.9)
MONOCYTES NFR BLD AUTO: 7.6 % (ref 5–12)
NEUTROPHILS NFR BLD AUTO: 13.63 10*3/MM3 (ref 1.7–7)
NEUTROPHILS NFR BLD AUTO: 78.6 % (ref 42.7–76)
NRBC BLD AUTO-RTO: 0.1 /100 WBC (ref 0–0.2)
PLATELET # BLD AUTO: 118 10*3/MM3 (ref 140–450)
PMV BLD AUTO: 10.9 FL (ref 6–12)
RBC # BLD AUTO: 3.15 10*6/MM3 (ref 3.77–5.28)
UNIT  ABO: NORMAL
UNIT  ABO: NORMAL
UNIT  RH: NORMAL
UNIT  RH: NORMAL
WBC # BLD AUTO: 17.34 10*3/MM3 (ref 3.4–10.8)

## 2021-05-19 PROCEDURE — P9016 RBC LEUKOCYTES REDUCED: HCPCS

## 2021-05-19 PROCEDURE — 85025 COMPLETE CBC W/AUTO DIFF WBC: CPT | Performed by: OBSTETRICS & GYNECOLOGY

## 2021-05-19 PROCEDURE — 86900 BLOOD TYPING SEROLOGIC ABO: CPT

## 2021-05-19 PROCEDURE — 36430 TRANSFUSION BLD/BLD COMPNT: CPT

## 2021-05-19 RX ADMIN — IBUPROFEN 600 MG: 600 TABLET, FILM COATED ORAL at 16:44

## 2021-05-19 RX ADMIN — OXYCODONE HYDROCHLORIDE AND ACETAMINOPHEN 1 TABLET: 5; 325 TABLET ORAL at 06:03

## 2021-05-19 RX ADMIN — DOCUSATE SODIUM 100 MG: 100 CAPSULE, LIQUID FILLED ORAL at 08:01

## 2021-05-19 RX ADMIN — DOCUSATE SODIUM 100 MG: 100 CAPSULE, LIQUID FILLED ORAL at 19:49

## 2021-05-19 RX ADMIN — BENZOCAINE AND LEVOMENTHOL: 200; 5 SPRAY TOPICAL at 03:42

## 2021-05-19 RX ADMIN — IBUPROFEN 600 MG: 600 TABLET, FILM COATED ORAL at 06:03

## 2021-05-19 RX ADMIN — LEVOTHYROXINE SODIUM 50 MCG: 0.05 TABLET ORAL at 06:03

## 2021-05-19 NOTE — ANESTHESIA POSTPROCEDURE EVALUATION
Patient: Imani Ryan    Procedure Summary     Date: 05/18/21 Room / Location:     Anesthesia Start: 1236 Anesthesia Stop: 1718    Procedure: LABOR ANALGESIA Diagnosis:     Scheduled Providers:  Provider: Mann Marquez MD    Anesthesia Type: epidural ASA Status: 2          Anesthesia Type: epidural    Vitals  Vitals Value Taken Time   /65 05/18/21 2045   Temp 36.9 °C (98.4 °F) 05/18/21 1915   Pulse 101 05/18/21 2045   Resp 18 05/18/21 2031   SpO2 92 % 05/18/21 2044   Vitals shown include unvalidated device data.        Anesthesia Post Evaluation

## 2021-05-20 LAB
BASOPHILS # BLD AUTO: 0.06 10*3/MM3 (ref 0–0.2)
BASOPHILS NFR BLD AUTO: 0.3 % (ref 0–1.5)
DEPRECATED RDW RBC AUTO: 44.3 FL (ref 37–54)
EOSINOPHIL # BLD AUTO: 0.16 10*3/MM3 (ref 0–0.4)
EOSINOPHIL NFR BLD AUTO: 0.9 % (ref 0.3–6.2)
ERYTHROCYTE [DISTWIDTH] IN BLOOD BY AUTOMATED COUNT: 13.7 % (ref 12.3–15.4)
HCT VFR BLD AUTO: 27.7 % (ref 34–46.6)
HGB BLD-MCNC: 9.3 G/DL (ref 12–15.9)
IMM GRANULOCYTES # BLD AUTO: 0.48 10*3/MM3 (ref 0–0.05)
IMM GRANULOCYTES NFR BLD AUTO: 2.8 % (ref 0–0.5)
LYMPHOCYTES # BLD AUTO: 2.71 10*3/MM3 (ref 0.7–3.1)
LYMPHOCYTES NFR BLD AUTO: 15.8 % (ref 19.6–45.3)
MCH RBC QN AUTO: 30 PG (ref 26.6–33)
MCHC RBC AUTO-ENTMCNC: 33.6 G/DL (ref 31.5–35.7)
MCV RBC AUTO: 89.4 FL (ref 79–97)
MONOCYTES # BLD AUTO: 1.09 10*3/MM3 (ref 0.1–0.9)
MONOCYTES NFR BLD AUTO: 6.3 % (ref 5–12)
NEUTROPHILS NFR BLD AUTO: 12.69 10*3/MM3 (ref 1.7–7)
NEUTROPHILS NFR BLD AUTO: 73.9 % (ref 42.7–76)
NRBC BLD AUTO-RTO: 0 /100 WBC (ref 0–0.2)
PLATELET # BLD AUTO: 152 10*3/MM3 (ref 140–450)
PMV BLD AUTO: 10.5 FL (ref 6–12)
RBC # BLD AUTO: 3.1 10*6/MM3 (ref 3.77–5.28)
WBC # BLD AUTO: 17.19 10*3/MM3 (ref 3.4–10.8)

## 2021-05-20 PROCEDURE — 85025 COMPLETE CBC W/AUTO DIFF WBC: CPT | Performed by: OBSTETRICS & GYNECOLOGY

## 2021-05-20 RX ORDER — LANOLIN
CREAM (ML) TOPICAL AS NEEDED
Status: DISCONTINUED | OUTPATIENT
Start: 2021-05-20 | End: 2021-05-21 | Stop reason: HOSPADM

## 2021-05-20 RX ADMIN — IBUPROFEN 600 MG: 600 TABLET, FILM COATED ORAL at 05:59

## 2021-05-20 RX ADMIN — DOCUSATE SODIUM 100 MG: 100 CAPSULE, LIQUID FILLED ORAL at 07:53

## 2021-05-20 RX ADMIN — Medication: at 17:36

## 2021-05-20 RX ADMIN — Medication: at 07:53

## 2021-05-20 RX ADMIN — BENZOCAINE 1 APPLICATION: 5.6 OINTMENT TOPICAL at 08:15

## 2021-05-20 RX ADMIN — BENZOCAINE AND LEVOMENTHOL: 200; 5 SPRAY TOPICAL at 08:15

## 2021-05-20 RX ADMIN — DOCUSATE SODIUM 100 MG: 100 CAPSULE, LIQUID FILLED ORAL at 20:42

## 2021-05-20 RX ADMIN — SIMETHICONE 80 MG: 80 TABLET, CHEWABLE ORAL at 07:53

## 2021-05-20 RX ADMIN — IBUPROFEN 600 MG: 600 TABLET, FILM COATED ORAL at 14:43

## 2021-05-20 RX ADMIN — LEVOTHYROXINE SODIUM 50 MCG: 0.05 TABLET ORAL at 05:59

## 2021-05-21 VITALS
SYSTOLIC BLOOD PRESSURE: 109 MMHG | DIASTOLIC BLOOD PRESSURE: 74 MMHG | WEIGHT: 122 LBS | BODY MASS INDEX: 23.95 KG/M2 | RESPIRATION RATE: 17 BRPM | HEART RATE: 86 BPM | HEIGHT: 60 IN | TEMPERATURE: 98.3 F | OXYGEN SATURATION: 100 %

## 2021-05-21 PROBLEM — Z34.90 PREGNANCY, UNSPECIFIED GESTATIONAL AGE: Status: RESOLVED | Noted: 2020-10-05 | Resolved: 2021-05-21

## 2021-05-21 PROBLEM — R79.89 ELEVATED PROLACTIN LEVEL: Status: RESOLVED | Noted: 2018-07-23 | Resolved: 2021-05-21

## 2021-05-21 PROCEDURE — 0503F POSTPARTUM CARE VISIT: CPT | Performed by: OBSTETRICS & GYNECOLOGY

## 2021-05-21 RX ORDER — IBUPROFEN 800 MG/1
800 TABLET ORAL EVERY 8 HOURS PRN
Qty: 40 TABLET | Refills: 2 | Status: SHIPPED | OUTPATIENT
Start: 2021-05-21 | End: 2021-06-17

## 2021-05-21 RX ORDER — DOCUSATE SODIUM 100 MG/1
100 CAPSULE, LIQUID FILLED ORAL 2 TIMES DAILY
Qty: 60 CAPSULE | Refills: 1 | Status: SHIPPED | OUTPATIENT
Start: 2021-05-21 | End: 2021-06-17

## 2021-05-21 RX ORDER — OXYCODONE HYDROCHLORIDE AND ACETAMINOPHEN 5; 325 MG/1; MG/1
1 TABLET ORAL EVERY 4 HOURS PRN
Qty: 12 TABLET | Refills: 0 | Status: SHIPPED | OUTPATIENT
Start: 2021-05-21 | End: 2021-06-04

## 2021-05-21 RX ORDER — HYDROCORTISONE ACETATE 25 MG/1
25 SUPPOSITORY RECTAL NIGHTLY
Qty: 12 EACH | Refills: 1 | Status: SHIPPED | OUTPATIENT
Start: 2021-05-21 | End: 2021-06-02

## 2021-05-21 RX ADMIN — IBUPROFEN 600 MG: 600 TABLET, FILM COATED ORAL at 00:38

## 2021-05-21 RX ADMIN — LEVOTHYROXINE SODIUM 50 MCG: 0.05 TABLET ORAL at 06:29

## 2021-05-21 RX ADMIN — DOCUSATE SODIUM 100 MG: 100 CAPSULE, LIQUID FILLED ORAL at 08:45

## 2021-05-22 LAB
BH BB BLOOD EXPIRATION DATE: NORMAL
BH BB BLOOD EXPIRATION DATE: NORMAL
BH BB BLOOD TYPE BARCODE: 7300
BH BB BLOOD TYPE BARCODE: 7300
BH BB DISPENSE STATUS: NORMAL
BH BB DISPENSE STATUS: NORMAL
BH BB PRODUCT CODE: NORMAL
BH BB PRODUCT CODE: NORMAL
BH BB UNIT NUMBER: NORMAL
BH BB UNIT NUMBER: NORMAL
CROSSMATCH INTERPRETATION: NORMAL
CROSSMATCH INTERPRETATION: NORMAL
UNIT  ABO: NORMAL
UNIT  ABO: NORMAL
UNIT  RH: NORMAL
UNIT  RH: NORMAL

## 2021-05-22 NOTE — DISCHARGE SUMMARY
Vaginal delivery Discharge Summary      Date of Admission: 2021    Date of Discharge:  2021    Patient: Imani Ryan      MR#:[id number]    Surgeon/OB: Guille Thomas     Discharge Diagnosis:   Vaginal Delivery at 38w4d, uncomplicated recovery  Fourth degree laceration  Anemia from acute blood loss  Status post transfusion      Procedures:  Vaginal, Spontaneous     2021    5:18 PM      Anesthesia:  Epidural     Presenting Problem/History of Present Illness  Pregnancy [Z34.90]  Pregnancy [Z34.90]     Patient Active Problem List   Diagnosis   • Abnormal uterine bleeding (AUB)   • Subclinical hypothyroidism   • Infertility, female   • Iron deficiency anemia secondary to inadequate dietary iron intake   • Pregnancy   • Anemia due to acute blood loss   • Fourth degree perineal laceration   •  (normal spontaneous vaginal delivery)       Hospital Course  Patient is a 31 y.o. female  at 38w4d status post vaginal delivery.    Patient is ambulating, tolerating a regular diet.  Perineum is intact.    The patient's delivery was complicated by poor degree laceration repaired without difficulty.  The patient had excessive blood loss with a laceration resulting in symptomatic anemia and the patient was transfused.  The patient had significant pain the first 2 days after the procedure that improved on the third day of discharge.    The patient is also developed moderate external nonthrombosed hemorrhoid      Infant:   male  fetus 3033 g (6 lb 11 oz)  with Apgar scores of 8 , 9  at five minutes.    Condition on Discharge:  Stable    Vital Signs  Temp:  [98.3 °F (36.8 °C)-98.4 °F (36.9 °C)] 98.3 °F (36.8 °C)  Heart Rate:  [83-86] 86  Resp:  [16-17] 17  BP: (101-109)/(71-74) 109/74    Lab Results   Component Value Date    WBC 17.19 (H) 2021    HGB 9.3 (L) 2021    HCT 27.7 (L) 2021    MCV 89.4 2021     2021       Discharge Disposition  Home or Self Care    Discharge  Medications     Discharge Medications      New Medications      Instructions Start Date   hydrocortisone 25 MG suppository  Commonly known as: ANUSOL-HC   25 mg, Rectal, Nightly      ibuprofen 800 MG tablet  Commonly known as: ADVIL,MOTRIN   800 mg, Oral, Every 8 Hours PRN      oxyCODONE-acetaminophen 5-325 MG per tablet  Commonly known as: PERCOCET   1 tablet, Oral, Every 4 Hours PRN      Stool Softener Laxative 100 MG capsule  Generic drug: docusate sodium   100 mg, Oral, 2 Times Daily         Continue These Medications      Instructions Start Date   ferrous sulfate 325 (65 FE) MG tablet   325 mg, Oral, Daily With Breakfast      levothyroxine 50 MCG tablet  Commonly known as: Synthroid   50 mcg, Oral, Daily      PRE-ANAHI PO   Oral      PROBIOTIC-10 PO   Oral             Discharge Diet: Regular    Activity at Discharge:     Follow-up Appointments  No future appointments.  Additional Instructions for the Follow-ups that You Need to Schedule     Call MD for problems / concerns.   As directed      Heavy bleeding:  Greater than a pad an hour for more than 2 hours  Fever greater than 101.3   Redness of the episiotomy or vaginal laceration and/or severe pain increased from discharge pain.      Order Comments: Heavy bleeding:  Greater than a pad an hour for more than 2 hours Fever greater than 101.3 Redness of the episiotomy or vaginal laceration and/or severe pain increased from discharge pain.           Discharge Follow-up with Specified Provider: William; 1 Week   As directed      To: William    Follow Up: 1 Week                 Prenatal labs/vax:   Immunization History   Administered Date(s) Administered   • Tdap 2021         External Prenatal Results     Pregnancy Outside Results - Transcribed From Office Records - See Scanned Records For Details     Test Value Date Time    ABO  B  21 1142    Rh  Positive  21 1142    Antibody Screen  Negative  21 1142       Negative  20 1605    Varicella IgG  ^ Pos H/O  10/05/20     Rubella  7.29 index 09/25/20 1605    Hgb  9.3 g/dL 05/20/21 0657       10.0 g/dL 05/19/21 0845       8.4 g/dL 05/18/21 2104       5.9 g/dL 05/18/21 2033       10.3 g/dL 05/18/21 1756       14.2 g/dL 05/18/21 1142       11.3 g/dL 03/05/21 1123       12.4 g/dL 09/25/20 1605    Hct  27.7 % 05/20/21 0657       28.4 % 05/19/21 0845       25.4 % 05/18/21 2104       18.6 % 05/18/21 2033       31.2 % 05/18/21 1756       43.1 % 05/18/21 1142       33.5 % 03/05/21 1123       38.1 % 09/25/20 1605    Glucose Fasting GTT       Glucose Tolerance Test 1 hour       Glucose Tolerance Test 3 hour       Gonorrhea (discrete)  Negative  10/05/20 1006    Chlamydia (discrete)  Negative  10/05/20 1006    RPR  Non Reactive  09/25/20 1605    VDRL       Syphilis Antibody       HBsAg  Negative  09/25/20 1605    Herpes Simplex Virus PCR       Herpes Simplex VIrus Culture       HIV  Non Reactive  09/25/20 1605    Hep C RNA Quant PCR       Hep C Antibody  0.1 s/co ratio 09/25/20 1605    AFP  67.1 ng/mL 12/22/20 1210    Group B Strep  Negative  04/29/21 1554    GBS Susceptibility to Clindamycin       GBS Susceptibility to Erythromycin       Fetal Fibronectin       Genetic Testing, Maternal Blood             Drug Screening     Test Value Date Time    Urine Drug Screen       Amphetamine Screen       Barbiturate Screen       Benzodiazepine Screen       Methadone Screen       Phencyclidine Screen       Opiates Screen       THC Screen       Cocaine Screen       Propoxyphene Screen       Buprenorphine Screen       Methamphetamine Screen       Oxycodone Screen       Tricyclic Antidepressants Screen             Legend    ^: Historical                          Daniel Sanchez MD  05/21/21  21:00 EDT

## 2021-05-24 ENCOUNTER — TELEPHONE (OUTPATIENT)
Dept: OBSTETRICS AND GYNECOLOGY | Age: 31
End: 2021-05-24

## 2021-05-27 ENCOUNTER — POSTPARTUM VISIT (OUTPATIENT)
Dept: OBSTETRICS AND GYNECOLOGY | Age: 31
End: 2021-05-27

## 2021-05-27 VITALS
DIASTOLIC BLOOD PRESSURE: 66 MMHG | SYSTOLIC BLOOD PRESSURE: 108 MMHG | WEIGHT: 108 LBS | BODY MASS INDEX: 21.2 KG/M2 | HEIGHT: 60 IN

## 2021-05-27 DIAGNOSIS — Z98.890 POST-OPERATIVE STATE: ICD-10-CM

## 2021-05-27 DIAGNOSIS — O09.293 HX OF MATERNAL LACERATION, 4TH DEGREE, CURRENTLY PREGNANT, THIRD TRIMESTER: Primary | ICD-10-CM

## 2021-05-27 PROCEDURE — 0503F POSTPARTUM CARE VISIT: CPT | Performed by: OBSTETRICS & GYNECOLOGY

## 2021-05-27 RX ORDER — LIDOCAINE 50 MG/G
OINTMENT TOPICAL
Qty: 35 G | Refills: 0 | Status: SHIPPED | OUTPATIENT
Start: 2021-05-27 | End: 2022-08-01

## 2021-05-27 RX ORDER — COCOA BUTTER, PHENYLEPHRINE HYDROCHLORIDE 2211; 6.25 MG/1; MG/1
1 SUPPOSITORY RECTAL AS NEEDED
Qty: 15 SUPPOSITORY | Refills: 0 | Status: SHIPPED | OUTPATIENT
Start: 2021-05-27 | End: 2021-07-29

## 2021-05-27 NOTE — PROGRESS NOTES
"Subjective   Chief Complaint   Patient presents with   • Postpartum Care     1 week postpartum f/u for pain     Imani Ryan is a 31 y.o. year old  presenting to be seen for 1 week postdelivery visit due to fourth degree laceration.  Patient was able to have a bowel movement about 4 days ago and it was formed.  Since then she has had passage of gas with incontinence of a small amount of liquid stool.  Patient complains of pain that feels like hemorrhoids.  She has been taking Colace.  She did restart taking Percocet due to the pain.  She is eating and drinking normally.  He is doing the sitz bath's.      The following portions of the patient's history were reviewed and updated as appropriate:current medications and allergies       Objective   /66   Ht 152.4 cm (60\")   Wt 49 kg (108 lb)   LMP 2020   Breastfeeding Yes   BMI 21.09 kg/m²     General:   Patient is standing up and moving somewhat slowly.   Abdomen: soft, non-tender; no masses   Pelvis: External genitalia appear normal.  Perineum is intact.  There is one small nonthrombosed hemorrhoid noted.  Small amount of brown liquid stool near the anus.          Assessment   1. S/P repair of fourth degree laceration-patient is having pain.  We will start lidocaine ointment and add Preparation H suppositories.  She is having some incontinence of liquid stool.  We discussed continuing the Colace and possibly adding MiraLAX to see if we can fully evacuate the rectum.  Externally the area appears intact.     Plan   1. Patient will continue the sitz bath's, Colace and add preparation suppositories and lidocaine ointment.  She will follow up with me in 1 week.    New Medications Ordered This Visit   Medications   • lidocaine (XYLOCAINE) 5 % ointment     Sig: Apply  topically to the appropriate area as directed Every 2 (Two) Hours As Needed for Mild Pain .     Dispense:  35 g     Refill:  0   • phenylephrine-cocoa Butter (Preparation H) 0.25-88.44 % " suppository suppository     Sig: Insert 1 suppository into the rectum As Needed for Hemorrhoids.     Dispense:  15 suppository     Refill:  0              Note: Speech recognition transcription software may have been used to create portions of this document.  An attempt at proofreading has been made but errors in transcription could still be present.

## 2021-06-04 ENCOUNTER — POSTPARTUM VISIT (OUTPATIENT)
Dept: OBSTETRICS AND GYNECOLOGY | Age: 31
End: 2021-06-04

## 2021-06-04 VITALS
BODY MASS INDEX: 20.62 KG/M2 | SYSTOLIC BLOOD PRESSURE: 98 MMHG | DIASTOLIC BLOOD PRESSURE: 58 MMHG | HEIGHT: 60 IN | WEIGHT: 105 LBS

## 2021-06-04 PROCEDURE — 99213 OFFICE O/P EST LOW 20 MIN: CPT | Performed by: OBSTETRICS & GYNECOLOGY

## 2021-06-04 NOTE — PROGRESS NOTES
"  Chief gdulizhta-fqfgjz-ua of fourth degree laceration.    History of present illness- Patient is a 31 y.o.  who reports that she has been better able to control her bowels.  Patient was constipated and after doing a enema was able to have a large bowel movement and since then things have been a bit better.  She is taking Colace.  She is worried that the stitches will tear.  She has been using the Preparation H and external ointment for hemorrhoid.  Bleeding has been on and off.    Review of Systems   Constitutional: Negative for fatigue.   Respiratory: Negative for shortness of breath.    Gastrointestinal: Negative for abdominal pain.   Genitourinary: Negative for dysuria.   Neurological: Negative for headaches.   Psychiatric/Behavioral: Negative for dysphoric mood.     BP 98/58   Ht 152.4 cm (60\")   Wt 47.6 kg (105 lb)   Breastfeeding Yes   BMI 20.51 kg/m²   Physical Exam  Constitutional:       General: She is not in acute distress.     Appearance: Normal appearance.   Genitourinary:      Genitourinary Comments: Perineum is intact.  Sutures are visible on the left labia and at the introitus.  There is no swelling.  No internal exam was done.   Neurological:      Mental Status: She is alert.   Psychiatric:         Mood and Affect: Mood normal.         Thought Content: Thought content normal.              Diagnoses and all orders for this visit:    1. Fourth degree perineal laceration (Primary)  -     Ambulatory Referral to Physical Therapy    Patient is improved from last week.  Will refer the patient for pelvic floor physical therapy to start now or later depending on her preference.  She is experiencing some itching at the stitches so we will try some hydrocortisone ointment over-the-counter.  Continue Colace.  Follow-up in 2 weeks.  "

## 2021-06-15 ENCOUNTER — TELEPHONE (OUTPATIENT)
Dept: OBSTETRICS AND GYNECOLOGY | Age: 31
End: 2021-06-15

## 2021-06-15 RX ORDER — CEPHALEXIN 500 MG/1
500 CAPSULE ORAL 3 TIMES DAILY
Qty: 30 CAPSULE | Refills: 0 | Status: SHIPPED | OUTPATIENT
Start: 2021-06-15 | End: 2021-06-25

## 2021-06-15 NOTE — TELEPHONE ENCOUNTER
The symptoms sound like mastitis.  Okay to offer appointment with nurse practitioner or PA but would recommend that she go ahead and start dicloxacillin 500 mg 1 p.o. 4 times daily for 10 days.  Patient should continue to breast-feed and can take Tylenol to bring down any fever.

## 2021-06-15 NOTE — TELEPHONE ENCOUNTER
Pt notd, pt has appt Thursday. Pt has a PCN allergy listed but when asked about it she states that she was a very young child and does not remember what type of reaction she had. Pt also asked her mother and she does not remember either. Pt does not think she has taken Keflex before.

## 2021-06-15 NOTE — TELEPHONE ENCOUNTER
Pt spouse calling, L breast lump, pt had a fever two days ago, some redness, close to the under arm.  Tried warm and cold compress.  Pt is having chills.  We moved pt apt from Friday to Thursday.  Please advise.

## 2021-06-16 NOTE — TELEPHONE ENCOUNTER
Dr. Thomas Pt spouse calling, stating that pt is dizzy, and sweating when she woke up and nausseaus.  When pt looks down they get dizzy.  Pt has ate and is feeling better but is weak.

## 2021-06-17 ENCOUNTER — POSTPARTUM VISIT (OUTPATIENT)
Dept: OBSTETRICS AND GYNECOLOGY | Age: 31
End: 2021-06-17

## 2021-06-17 VITALS
HEIGHT: 60 IN | SYSTOLIC BLOOD PRESSURE: 124 MMHG | DIASTOLIC BLOOD PRESSURE: 84 MMHG | BODY MASS INDEX: 20.62 KG/M2 | WEIGHT: 105 LBS

## 2021-06-17 DIAGNOSIS — D62 ANEMIA DUE TO ACUTE BLOOD LOSS: Primary | ICD-10-CM

## 2021-06-17 DIAGNOSIS — E03.8 SUBCLINICAL HYPOTHYROIDISM: ICD-10-CM

## 2021-06-17 PROBLEM — Z34.90 PREGNANCY: Status: RESOLVED | Noted: 2021-05-18 | Resolved: 2021-06-17

## 2021-06-17 PROCEDURE — 99213 OFFICE O/P EST LOW 20 MIN: CPT | Performed by: OBSTETRICS & GYNECOLOGY

## 2021-06-17 NOTE — PROGRESS NOTES
"  Chief complaint-pain in the left breast    History of present illness- Patient is a 31 y.o.  who complains of pain in the left breast with fever about 2 days ago.  Patient called in and was started on Keflex.  Her fever and chills have resolved.  She still some dizziness.  Her last hemoglobin at the hospital was 9.2.  She did receive 2 units of blood.  Her bowel movements are now regular.  She did not restart her prenatal vitamins because of the iron in them.  She is not having any depression.        /84   Ht 152.4 cm (60\")   Wt 47.6 kg (105 lb)   Breastfeeding Yes   BMI 20.51 kg/m²   Physical Exam  Constitutional:       General: She is not in acute distress.     Appearance: Normal appearance. She is not ill-appearing.   Genitourinary:      Genitourinary Comments: External genitalia appear normal.  Sutures on the left labia are intact and noted.  Perineum appears normal with no edema.   Abdominal:      General: Abdomen is flat. There is no distension.      Palpations: Abdomen is soft.   Neurological:      Mental Status: She is alert.   Psychiatric:         Mood and Affect: Mood normal.         Thought Content: Thought content normal.         Judgment: Judgment normal.     Breast exam-the left breast shows no erythema or streaking.  There is normal milky discharge from the nipples.  No masses are palpated.      Diagnoses and all orders for this visit:    1. Anemia due to acute blood loss (Primary)  -     CBC & Differential  -     TSH    2. Fourth degree perineal laceration    3. Subclinical hypothyroidism    4. Mastitis associated with childbirth, delivered    Patient has mastitis that is responding to Keflex started over the phone 2 days ago.  Discussed natural course of mastitis.  Complete 10 days.  Patient is having actually more frequent bowel movements.  Lactation consultants number also given.    Fourth degree laceration appears to be healing well and patient is having regular bowel " movements.    Dizziness-check CBC today.  Patient will start a multivitamin and I will call her with results.    Hypothyroidism-check TSH    Pelvic exam in 2 weeks.

## 2021-06-18 ENCOUNTER — TELEPHONE (OUTPATIENT)
Dept: OBSTETRICS AND GYNECOLOGY | Age: 31
End: 2021-06-18

## 2021-06-18 LAB
BASOPHILS # BLD AUTO: 0.04 10*3/MM3 (ref 0–0.2)
BASOPHILS NFR BLD AUTO: 0.6 % (ref 0–1.5)
EOSINOPHIL # BLD AUTO: 0.18 10*3/MM3 (ref 0–0.4)
EOSINOPHIL NFR BLD AUTO: 2.8 % (ref 0.3–6.2)
ERYTHROCYTE [DISTWIDTH] IN BLOOD BY AUTOMATED COUNT: 12.9 % (ref 12.3–15.4)
HCT VFR BLD AUTO: 39.4 % (ref 34–46.6)
HGB BLD-MCNC: 12.5 G/DL (ref 12–15.9)
IMM GRANULOCYTES # BLD AUTO: 0.02 10*3/MM3 (ref 0–0.05)
IMM GRANULOCYTES NFR BLD AUTO: 0.3 % (ref 0–0.5)
LYMPHOCYTES # BLD AUTO: 1.7 10*3/MM3 (ref 0.7–3.1)
LYMPHOCYTES NFR BLD AUTO: 26.9 % (ref 19.6–45.3)
MCH RBC QN AUTO: 29.9 PG (ref 26.6–33)
MCHC RBC AUTO-ENTMCNC: 31.7 G/DL (ref 31.5–35.7)
MCV RBC AUTO: 94.3 FL (ref 79–97)
MONOCYTES # BLD AUTO: 0.53 10*3/MM3 (ref 0.1–0.9)
MONOCYTES NFR BLD AUTO: 8.4 % (ref 5–12)
NEUTROPHILS # BLD AUTO: 3.85 10*3/MM3 (ref 1.7–7)
NEUTROPHILS NFR BLD AUTO: 61 % (ref 42.7–76)
NRBC BLD AUTO-RTO: 0 /100 WBC (ref 0–0.2)
PLATELET # BLD AUTO: 283 10*3/MM3 (ref 140–450)
RBC # BLD AUTO: 4.18 10*6/MM3 (ref 3.77–5.28)
TSH SERPL DL<=0.005 MIU/L-ACNC: 1.96 UIU/ML (ref 0.27–4.2)
WBC # BLD AUTO: 6.32 10*3/MM3 (ref 3.4–10.8)

## 2021-06-18 NOTE — TELEPHONE ENCOUNTER
----- Message from Guille Thomas MD sent at 6/18/2021  1:08 PM EDT -----  Please notify CBC looks much better.  Hemoglobin is improved from 9 to 12.  Patient's TSH is also normal.

## 2021-07-01 ENCOUNTER — POSTPARTUM VISIT (OUTPATIENT)
Dept: OBSTETRICS AND GYNECOLOGY | Age: 31
End: 2021-07-01

## 2021-07-01 VITALS
SYSTOLIC BLOOD PRESSURE: 112 MMHG | HEIGHT: 60 IN | WEIGHT: 102 LBS | BODY MASS INDEX: 20.03 KG/M2 | DIASTOLIC BLOOD PRESSURE: 72 MMHG

## 2021-07-01 DIAGNOSIS — B37.31 YEAST VAGINITIS: Primary | ICD-10-CM

## 2021-07-01 PROBLEM — D50.8 IRON DEFICIENCY ANEMIA SECONDARY TO INADEQUATE DIETARY IRON INTAKE: Status: RESOLVED | Noted: 2021-03-19 | Resolved: 2021-07-01

## 2021-07-01 PROBLEM — D62 ANEMIA DUE TO ACUTE BLOOD LOSS: Status: RESOLVED | Noted: 2021-05-19 | Resolved: 2021-07-01

## 2021-07-01 PROCEDURE — 0503F POSTPARTUM CARE VISIT: CPT | Performed by: OBSTETRICS & GYNECOLOGY

## 2021-07-01 RX ORDER — FLUCONAZOLE 150 MG/1
150 TABLET ORAL ONCE
Qty: 1 TABLET | Refills: 0 | Status: SHIPPED | OUTPATIENT
Start: 2021-07-01 | End: 2021-07-01

## 2021-07-01 NOTE — PROGRESS NOTES
"Subjective   Imani Ryan is a 31 y.o. female who presents for a postpartum visit.  Patient is 6 weeks out from vaginal delivery.  Patient did have 1/4 degree laceration and has had a slow recovery.  She is also had mastitis and she completed antibiotics.  She complains of some vaginal itching currently.  She is also worried about some lumpiness to her breast.  She would like her hemorrhoid and perineum inspected but declines pelvic examination yet.  She is having regular daily bowel movements.  She denies any depression.    The following portions of the patient's history were reviewed and updated as appropriate: allergies, current medications,and problem list.    Review of Systems  Pertinent items are noted in HPI.    Objective   /72   Ht 152.4 cm (60\")   Wt 46.3 kg (102 lb)   Breastfeeding Yes   BMI 19.92 kg/m²    General:  Alert and oriented, NAD    Breasts:   No skin changes or nipple discharge.  Bilateral examination reveals no dominant masses or lymphadenopathy.  Changes consistent with breast-feeding are felt.       Heart:     Abdomen: Normal findings, nontender    Vulva:  Thick curdy discharge noted consistent with yeast in the bilateral labia.   Vagina:    Cervix:     Corpus:    Adnexa:     TSH and hemoglobin were normal.    Assessment/Plan     Patient is postpartum but declines internal pelvic exam today.  She would like to wait till 4 weeks postpartum.  The perineum appears well-healed from the fourth degree laceration.  Her hemorrhoid appears to be smaller  Patient has had mastitis and now appears to have a yeast infection.  I will prescribe Diflucan  Patient will follow up with lactation consultant  Patient is not currently sexually active               "

## 2021-07-02 ENCOUNTER — TRANSCRIBE ORDERS (OUTPATIENT)
Dept: ADMINISTRATIVE | Facility: HOSPITAL | Age: 31
End: 2021-07-02

## 2021-07-14 ENCOUNTER — TELEPHONE (OUTPATIENT)
Dept: OBSTETRICS AND GYNECOLOGY | Age: 31
End: 2021-07-14

## 2021-07-14 NOTE — TELEPHONE ENCOUNTER
Patient delivered in 5/21 and is currently breastfeeding.She found a lump in her breast 3 days ago.Not tender,just concerned and wanted someone to look at it. calling for her.

## 2021-07-16 ENCOUNTER — OFFICE VISIT (OUTPATIENT)
Dept: OBSTETRICS AND GYNECOLOGY | Age: 31
End: 2021-07-16

## 2021-07-16 VITALS
BODY MASS INDEX: 20.22 KG/M2 | SYSTOLIC BLOOD PRESSURE: 110 MMHG | DIASTOLIC BLOOD PRESSURE: 62 MMHG | WEIGHT: 103 LBS | HEIGHT: 60 IN

## 2021-07-16 DIAGNOSIS — N63.20 LEFT BREAST LUMP: Primary | ICD-10-CM

## 2021-07-16 PROCEDURE — 99213 OFFICE O/P EST LOW 20 MIN: CPT | Performed by: NURSE PRACTITIONER

## 2021-07-16 NOTE — PROGRESS NOTES
"Kevan Ryan is a 31 y.o. female is being seen today for   Chief Complaint   Patient presents with   • Breast Problem     c/o found lump in left breast, unsure if it is painful because she is breastfeeding   .    History of Present Illness     Patient is 2 months postpartum and presents with a breast lump she noticed 5-6 days ago  States it is tender with palpation  She is breastfeeding and was treated for mastitis a few weeks ago but did not notice this lump until this week  Those symptoms have resolved and she denies any recurrence of symptoms  She has been applying heat, massage, vibration and feeding frequently in case it is a clog but it has not changed in size  Baby is eating every 3-4 hours and there has been no change in feeding schedule    The following portions of the patient's history were reviewed and updated as appropriate: allergies, current medications, past family history, past medical history, past social history, past surgical history and problem list.    /62   Ht 152.4 cm (60\")   Wt 46.7 kg (103 lb)   Breastfeeding Yes   BMI 20.12 kg/m²       Review of Systems   Constitutional: Negative.    HENT: Negative.    Eyes: Negative.    Respiratory: Negative.         Breast lump   Cardiovascular: Negative.    Gastrointestinal: Negative.    Endocrine: Negative.    Genitourinary: Negative.    Musculoskeletal: Negative.    Skin: Negative.    Allergic/Immunologic: Negative.    Neurological: Negative.    Hematological: Negative.    Psychiatric/Behavioral: Negative.        Objective   Physical Exam  Constitutional:       Appearance: She is well-developed.   Pulmonary:      Effort: Pulmonary effort is normal.   Chest:      Breasts:         Right: No inverted nipple, mass, nipple discharge, skin change or tenderness.         Left: Mass present. No inverted nipple, nipple discharge, skin change or tenderness.          Comments: 1cm round, mobile mass   Abdominal:      Palpations: Abdomen is " soft.   Skin:     General: Skin is warm and dry.   Neurological:      Mental Status: She is alert and oriented to person, place, and time.   Psychiatric:         Behavior: Behavior normal.           Assessment/Plan   Diagnoses and all orders for this visit:    1. Left breast lump (Primary)  -     US Breast Left Complete; Future      Small Left breast lump palpable on exam.  Difficult to tell if it is a clog (which could be related to previous mastitis) or something else.  She has treated it appropriately all week if it is a clog so left breast ultrasound was ordered.  Instructed to continue with warm compresses and massage as well as frequent feeding this weekend.  If any s/s of mastitis develop she should call back to be restarted on abxs.  Plan ultrasound now  and if appearance is consistent with a clog I would recommend ultrasound therapy at [facility].  She already has an appt with that group in august for treatment regarding her 4th degree vaginal lac with delivery.      Follow up as scheduled with Dr Thomas.             Total time spent today with Imani  was 20-29 minutes (level 3).  Greater than 50% of the time was spent coordinating care, answering her questions and counseling regarding pathophysiology of her presenting problem along with plans for any diagnositc work-up and treatment.

## 2021-07-28 ENCOUNTER — APPOINTMENT (OUTPATIENT)
Dept: WOMENS IMAGING | Facility: HOSPITAL | Age: 31
End: 2021-07-28

## 2021-07-28 PROCEDURE — G0279 TOMOSYNTHESIS, MAMMO: HCPCS | Performed by: RADIOLOGY

## 2021-07-28 PROCEDURE — 77066 DX MAMMO INCL CAD BI: CPT | Performed by: RADIOLOGY

## 2021-07-28 PROCEDURE — 77062 BREAST TOMOSYNTHESIS BI: CPT | Performed by: RADIOLOGY

## 2021-07-28 PROCEDURE — 76641 ULTRASOUND BREAST COMPLETE: CPT | Performed by: RADIOLOGY

## 2021-07-29 ENCOUNTER — TELEPHONE (OUTPATIENT)
Dept: OBSTETRICS AND GYNECOLOGY | Age: 31
End: 2021-07-29

## 2021-07-29 ENCOUNTER — POSTPARTUM VISIT (OUTPATIENT)
Dept: OBSTETRICS AND GYNECOLOGY | Age: 31
End: 2021-07-29

## 2021-07-29 VITALS
WEIGHT: 101 LBS | BODY MASS INDEX: 19.83 KG/M2 | HEIGHT: 60 IN | DIASTOLIC BLOOD PRESSURE: 64 MMHG | SYSTOLIC BLOOD PRESSURE: 108 MMHG

## 2021-07-29 DIAGNOSIS — R92.8 ABNORMAL MAMMOGRAM: Primary | ICD-10-CM

## 2021-07-29 DIAGNOSIS — N63.20 LEFT BREAST MASS: Primary | ICD-10-CM

## 2021-07-29 PROCEDURE — 99214 OFFICE O/P EST MOD 30 MIN: CPT | Performed by: OBSTETRICS & GYNECOLOGY

## 2021-07-29 NOTE — PROGRESS NOTES
"Subjective   Ashley Ryan is a 31 y.o. female who presents for follow-up pelvic exam.  Patient had discomfort at her postpartum visit and could not tolerate pelvic exam.  She did have 1/4 degree laceration at time of delivery.  That area is doing well.  She is going to go for physical therapy.    Since patient's last visit she felt a mass in the left breast and saw Tiffanie.  She was sent for imaging with ultrasound.  [facility] of the left breast and 2 areas at 9 and 4:00 have been found suspicious.  Biopsies have been recommended.  The right breast had some asymmetries that resolved on spot compression but ultrasound of the right breast was recommended.  Patient is not having pain in the breast.  She has no family history of breast cancer.      The following portions of the patient's history were reviewed and updated as appropriate: allergies, current medications,and problem list.    Review of Systems  Pertinent items are noted in HPI.    Objective   /64   Ht 152.4 cm (60\")   Wt 45.8 kg (101 lb)   Breastfeeding Yes   BMI 19.73 kg/m²    General:  Alert and oriented, NAD    Breasts:   Fibrous mass in the left breast at 9:00.  No masses are felt at 4:00.  No masses in the right breast.  No lymphadenopathy.  Changes consistent with breast-feeding are seen.       Heart:     Abdomen: Normal findings, nontender    Vulva: Normal, well-healed    Vagina: No lesions or abnormal discharge   Cervix:  Normal with no cervical motion tenderness   Corpus: Normal for post partum visit   Adnexa:  Non tender, non enlarged         Assessment/Plan     2 masses in the left breast on mammogram at 9 and 4:00.  I had a long discussion with the patient and her .  We discussed changes that can be seen on imaging due to breast-feeding.  We discussed that these areas have been recommended for biopsy.  We discussed possible side effects of biopsy including but not limited to bruising, infection, " milk fistula and discomfort that might interrupt breast-feeding.  They wish to proceed with biopsy.  Right breast ultrasound is ordered also.    History of fourth degree-patient is doing well.  She does want to go for pelvic physical therapy.  She has been having bowel movements normally.     Contraception: Condoms are planned.   Slow return to normal activities reviewed. Continue prenatal vitamins.. Follow up in 12 months or sooner as needed.

## 2021-08-09 ENCOUNTER — APPOINTMENT (OUTPATIENT)
Dept: WOMENS IMAGING | Facility: HOSPITAL | Age: 31
End: 2021-08-09

## 2021-08-09 PROCEDURE — 76641 ULTRASOUND BREAST COMPLETE: CPT | Performed by: RADIOLOGY

## 2021-08-09 PROCEDURE — 19084 BX BREAST ADD LESION US IMAG: CPT | Performed by: RADIOLOGY

## 2021-08-09 PROCEDURE — 19083 BX BREAST 1ST LESION US IMAG: CPT | Performed by: RADIOLOGY

## 2021-08-13 ENCOUNTER — TELEPHONE (OUTPATIENT)
Dept: OBSTETRICS AND GYNECOLOGY | Age: 31
End: 2021-08-13

## 2021-08-13 DIAGNOSIS — E03.8 SUBCLINICAL HYPOTHYROIDISM: Primary | ICD-10-CM

## 2021-08-13 DIAGNOSIS — R92.8 ABNORMAL MAMMOGRAM: Primary | ICD-10-CM

## 2021-08-13 NOTE — TELEPHONE ENCOUNTER
Pt is asking about her synthroid medication.  When can she recheck her thyroid levels to re-evaluate what dose she should be taking now?  Used to take 25mg and now she is taking 50mg and she thinks the dose might be too high.  They are aware Dr. Thomas is not in office today and might be next week before we have an answer.      Also simi - VERONICA spoke w/ pt's  and notified them of the mammo/us results.  Scheduled for 6 month f/u in Feb 2022.

## 2021-08-15 DIAGNOSIS — Z98.890 H/O BENIGN BREAST BIOPSY: Primary | ICD-10-CM

## 2021-08-15 DIAGNOSIS — R92.8 ABNORMAL MAMMOGRAM: Primary | ICD-10-CM

## 2021-08-16 NOTE — TELEPHONE ENCOUNTER
Please notify I would expect it to gradually be less uncomfortable.  She could take Motrin or put ice to the area.

## 2021-09-23 ENCOUNTER — TELEPHONE (OUTPATIENT)
Dept: OBSTETRICS AND GYNECOLOGY | Age: 31
End: 2021-09-23

## 2021-09-23 DIAGNOSIS — E03.8 SUBCLINICAL HYPOTHYROIDISM: Primary | ICD-10-CM

## 2021-09-24 LAB — TSH SERPL DL<=0.005 MIU/L-ACNC: 1.43 UIU/ML (ref 0.27–4.2)

## 2021-09-27 ENCOUNTER — TELEPHONE (OUTPATIENT)
Dept: OBSTETRICS AND GYNECOLOGY | Age: 31
End: 2021-09-27

## 2021-09-27 NOTE — TELEPHONE ENCOUNTER
----- Message from Guille Thomas MD sent at 9/27/2021 12:00 PM EDT -----  The patient could try Unisom or Benadryl.  ----- Message -----  From: Christina Barrett MA  Sent: 9/27/2021  10:02 AM EDT  To: Guille Thomas MD    Pt notd. Pt is having trouble sleeping, is there anything she can take to help. Pt is breastfeeding.

## 2021-10-18 ENCOUNTER — TELEPHONE (OUTPATIENT)
Dept: OBSTETRICS AND GYNECOLOGY | Age: 31
End: 2021-10-18

## 2021-10-18 NOTE — TELEPHONE ENCOUNTER
Please notify that we have a [employer] policy.  We highly recommend the vaccine and it is safe during breast-feeding.

## 2021-10-18 NOTE — TELEPHONE ENCOUNTER
Pt  calls stating pt is under much stress she is breast feeding and still recovering from her recent delivery. Due to this pt is asking for a letter stating her current condition is not suitable to receive the covid19 vaccination at this time, pt does want to receive it but not while she is breastfeeding she does not want to add any strain to her body. Pt  states they need to show the letter to immigration that proves pt will receive the vaccination once she has fully recovered. Pt  informed all of our doctors are advocates for the vaccine and do want their patients to receive it and I will still send back a message. Please advise

## 2021-10-25 RX ORDER — LEVOTHYROXINE SODIUM 0.05 MG/1
TABLET ORAL
Qty: 30 TABLET | Refills: 2 | Status: SHIPPED | OUTPATIENT
Start: 2021-10-25 | End: 2022-04-28

## 2022-04-28 RX ORDER — LEVOTHYROXINE SODIUM 0.05 MG/1
TABLET ORAL
Qty: 30 TABLET | Refills: 2 | Status: SHIPPED | OUTPATIENT
Start: 2022-04-28 | End: 2022-08-01 | Stop reason: SDUPTHER

## 2022-04-28 RX ORDER — LEVOTHYROXINE SODIUM 0.05 MG/1
50 TABLET ORAL DAILY
Qty: 30 TABLET | Refills: 2 | Status: SHIPPED | OUTPATIENT
Start: 2022-04-28 | End: 2022-08-23 | Stop reason: DRUGHIGH

## 2022-08-01 ENCOUNTER — OFFICE VISIT (OUTPATIENT)
Dept: OBSTETRICS AND GYNECOLOGY | Age: 32
End: 2022-08-01

## 2022-08-01 VITALS
SYSTOLIC BLOOD PRESSURE: 108 MMHG | BODY MASS INDEX: 19.44 KG/M2 | WEIGHT: 99 LBS | HEIGHT: 60 IN | DIASTOLIC BLOOD PRESSURE: 64 MMHG

## 2022-08-01 DIAGNOSIS — Z12.4 SCREENING FOR MALIGNANT NEOPLASM OF THE CERVIX: ICD-10-CM

## 2022-08-01 DIAGNOSIS — Z01.419 ENCOUNTER FOR GYNECOLOGICAL EXAMINATION WITHOUT ABNORMAL FINDING: ICD-10-CM

## 2022-08-01 DIAGNOSIS — Z11.51 SPECIAL SCREENING EXAMINATION FOR HUMAN PAPILLOMAVIRUS (HPV): ICD-10-CM

## 2022-08-01 DIAGNOSIS — E03.8 SUBCLINICAL HYPOTHYROIDISM: Primary | ICD-10-CM

## 2022-08-01 DIAGNOSIS — R00.0 TACHYCARDIA: ICD-10-CM

## 2022-08-01 PROBLEM — N93.9 ABNORMAL UTERINE BLEEDING (AUB): Status: RESOLVED | Noted: 2018-07-18 | Resolved: 2022-08-01

## 2022-08-01 PROCEDURE — 99395 PREV VISIT EST AGE 18-39: CPT | Performed by: OBSTETRICS & GYNECOLOGY

## 2022-08-01 NOTE — PROGRESS NOTES
Subjective     Chief Complaint   Patient presents with   • Gynecologic Exam     AC       History of Present Illness    Ashley Ryan is a 32 y.o.  who presents for annual exam.  Patient has history of fourth degree laceration.  She has been through physical therapy and feels like it was very successful.  They plan to use condoms for now and are not ready for another baby yet.  Patient just stopped breast-feeding last month.  She has had some insomnia but is a little bit better since she stopped breast-feeding.  She is also noticed some racing heartbeats at night.  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  She has not gone back for follow-up and does not want to at this point.  Her menses are regular every 28-30 days, lasting 7 days , dysmenorrhea mild, occurring first 1-2 days of flow  PT   Obstetric History:  OB History        1    Para   1    Term   1       0    AB   0    Living   1       SAB   0    IAB   0    Ectopic   0    Molar   0    Multiple   0    Live Births   1               Menstrual History:     No LMP recorded.         Current contraception: condoms  History of abnormal Pap smear: no  Received Gardasil immunization: no  Perform regular self breast exam : no  Family history of uterine or ovarian cancer: no  Family History of colon cancer: no  Family history of breast cancer: no    Mammogram: not indicated.  Colonoscopy: not indicated.  DEXA: not indicated.    Exercise: exercises 4 times a week  Calcium/Vitamin D: adequate intake    The following portions of the patient's history were reviewed and updated as appropriate: allergies, current medications, past family history, past medical history, past social history, past surgical history and problem list.    Review of Systems    Review of Systems   Constitutional: Negative for fatigue.   Respiratory: Negative for shortness of breath.    Gastrointestinal: Negative for abdominal pain.   Genitourinary:  "Negative for dysuria.   Neurological: Negative for headaches.   Psychiatric/Behavioral: Negative for dysphoric mood.     Objective   Physical Exam    /64   Ht 152.4 cm (60\")   Wt 44.9 kg (99 lb)   Breastfeeding No   BMI 19.33 kg/m²     General:   alert, appears stated age and cooperative   Neck: thyroid normal to palpation   Heart: regular rate and rhythm   Lungs: clear to auscultation bilaterally   Abdomen: soft, non-tender, without masses or organomegaly   Breast: inspection negative, no nipple discharge or bleeding, no masses or nodularity palpable   Vulva: normal, Bartholin's, Urethra, Longford's normal   Vagina: normal mucosa, normal discharge   Cervix: no cervical motion tenderness and no lesions   Uterus: non-tender, normal shape and consistency   Adnexa: no mass, fullness, tenderness   Rectal: not indicated     Assessment & Plan   Diagnoses and all orders for this visit:    1. Subclinical hypothyroidism (Primary)  -     TSH    2. Encounter for gynecological examination without abnormal finding  -     IGP, Aptima HPV, Rfx 16 / 18,45    3. Tachycardia  -     CBC (No Diff)    4. Screening for malignant neoplasm of the cervix  -     IGP, Aptima HPV, Rfx 16 / 18,45    5. Special screening examination for human papillomavirus (HPV)  -     IGP, Aptima HPV, Rfx 16 / 18,45      Discussed tachycardia.  Blood pressure is normal today.  Check CBC and TSH.  Patient may also have an anxiety component.  She can try melatonin and Benadryl to help with sleep.  Pap smear was done today  All questions answered.  Breast self exam technique reviewed and patient encouraged to perform self-exam monthly.  Discussed healthy lifestyle modifications.  Recommended 30 minutes of aerobic exercise five times per week.  Discussed calcium needs to prevent osteoporosis.                     "

## 2022-08-02 LAB — TSH SERPL DL<=0.005 MIU/L-ACNC: 2.84 UIU/ML (ref 0.45–4.5)

## 2022-08-04 LAB
CYTOLOGIST CVX/VAG CYTO: NORMAL
CYTOLOGY CVX/VAG DOC CYTO: NORMAL
CYTOLOGY CVX/VAG DOC THIN PREP: NORMAL
DX ICD CODE: NORMAL
HIV 1 & 2 AB SER-IMP: NORMAL
HPV I/H RISK 4 DNA CVX QL PROBE+SIG AMP: NEGATIVE
OTHER STN SPEC: NORMAL
STAT OF ADQ CVX/VAG CYTO-IMP: NORMAL

## 2022-08-23 ENCOUNTER — TELEPHONE (OUTPATIENT)
Dept: OBSTETRICS AND GYNECOLOGY | Age: 32
End: 2022-08-23

## 2022-08-23 RX ORDER — LEVOTHYROXINE SODIUM 0.03 MG/1
25 TABLET ORAL DAILY
Qty: 90 TABLET | Refills: 3 | Status: SHIPPED | OUTPATIENT
Start: 2022-08-23

## 2022-08-23 NOTE — TELEPHONE ENCOUNTER
----- Message from Ashley Ryan sent at 8/23/2022  9:37 AM EDT -----  Regarding: Change of medication?  Hi Doctor William,     Since Devika is only taking half of the 50mcg levothyroxine daily currently, could you change the prescription to 25 mcg tablets? thanks!

## 2023-06-16 ENCOUNTER — INITIAL PRENATAL (OUTPATIENT)
Dept: OBSTETRICS AND GYNECOLOGY | Age: 33
End: 2023-06-16
Payer: COMMERCIAL

## 2023-06-16 VITALS — BODY MASS INDEX: 18.94 KG/M2 | WEIGHT: 97 LBS | SYSTOLIC BLOOD PRESSURE: 108 MMHG | DIASTOLIC BLOOD PRESSURE: 64 MMHG

## 2023-06-16 DIAGNOSIS — Z13.89 SCREENING FOR BLOOD OR PROTEIN IN URINE: Primary | ICD-10-CM

## 2023-06-16 DIAGNOSIS — Z34.90 PREGNANCY, UNSPECIFIED GESTATIONAL AGE: ICD-10-CM

## 2023-06-16 DIAGNOSIS — Z11.3 SCREENING EXAMINATION FOR VENEREAL DISEASE: ICD-10-CM

## 2023-06-16 PROBLEM — O09.299 HISTORY OF MATERNAL FOURTH DEGREE PERINEAL LACERATION, CURRENTLY PREGNANT: Status: ACTIVE | Noted: 2023-06-16

## 2023-06-16 LAB
EXTERNAL GROUP B STREP ANTIGEN: POSITIVE
GLUCOSE UR STRIP-MCNC: NEGATIVE MG/DL
PROT UR STRIP-MCNC: NEGATIVE MG/DL
VZV IGG SER QL: NORMAL

## 2023-06-16 NOTE — PROGRESS NOTES
The patient is a 33-year-old  2 para 1-0-0-1 at 8 weeks 0 days ultrasound.  Patient does have long cycles lasting 40 to 50 days.  She has a significant history of 1/4 degree laceration that required a long recovery.  She desires primary  to avoid recurrence.  She also has hypothyroidism and is on 25 mcg of Synthroid.  She and her  desire cell free DNA testing.  Her prior carrier testing was normal.    Full history is reviewed.    Exam-see exam tab    Ultrasound shows viable plata intrauterine pregnancy.    Assessment-8 weeks  Nausea-we reviewed treatments  New OB information was reviewed  Cell free DNA planned  History of fourth degree laceration-patient desires primary .  We discussed the procedure.

## 2023-06-17 LAB
ABO GROUP BLD: NORMAL
BASOPHILS # BLD AUTO: 0 X10E3/UL (ref 0–0.2)
BASOPHILS NFR BLD AUTO: 0 %
BLD GP AB SCN SERPL QL: NEGATIVE
EOSINOPHIL # BLD AUTO: 0.1 X10E3/UL (ref 0–0.4)
EOSINOPHIL NFR BLD AUTO: 1 %
ERYTHROCYTE [DISTWIDTH] IN BLOOD BY AUTOMATED COUNT: 12.8 % (ref 11.7–15.4)
HBV SURFACE AG SERPL QL IA: NEGATIVE
HCT VFR BLD AUTO: 38.6 % (ref 34–46.6)
HCV IGG SERPL QL IA: NON REACTIVE
HGB BLD-MCNC: 12.2 G/DL (ref 11.1–15.9)
HIV 1+2 AB+HIV1 P24 AG SERPL QL IA: NON REACTIVE
IMM GRANULOCYTES # BLD AUTO: 0 X10E3/UL (ref 0–0.1)
IMM GRANULOCYTES NFR BLD AUTO: 0 %
LYMPHOCYTES # BLD AUTO: 1.8 X10E3/UL (ref 0.7–3.1)
LYMPHOCYTES NFR BLD AUTO: 19 %
MCH RBC QN AUTO: 29 PG (ref 26.6–33)
MCHC RBC AUTO-ENTMCNC: 31.6 G/DL (ref 31.5–35.7)
MCV RBC AUTO: 92 FL (ref 79–97)
MONOCYTES # BLD AUTO: 0.8 X10E3/UL (ref 0.1–0.9)
MONOCYTES NFR BLD AUTO: 8 %
NEUTROPHILS # BLD AUTO: 6.4 X10E3/UL (ref 1.4–7)
NEUTROPHILS NFR BLD AUTO: 72 %
PLATELET # BLD AUTO: 210 X10E3/UL (ref 150–450)
RBC # BLD AUTO: 4.21 X10E6/UL (ref 3.77–5.28)
RH BLD: POSITIVE
RPR SER QL: NON REACTIVE
RUBV IGG SERPL IA-ACNC: 6.47 INDEX
TSH SERPL DL<=0.005 MIU/L-ACNC: 1.54 UIU/ML (ref 0.45–4.5)
WBC # BLD AUTO: 9.2 X10E3/UL (ref 3.4–10.8)

## 2023-06-19 LAB
C TRACH RRNA SPEC QL NAA+PROBE: NEGATIVE
N GONORRHOEA RRNA SPEC QL NAA+PROBE: NEGATIVE

## 2023-06-22 PROBLEM — O23.40 GBS (GROUP B STREPTOCOCCUS) UTI COMPLICATING PREGNANCY: Status: ACTIVE | Noted: 2023-06-22

## 2023-06-22 PROBLEM — B95.1 GBS (GROUP B STREPTOCOCCUS) UTI COMPLICATING PREGNANCY: Status: ACTIVE | Noted: 2023-06-22

## 2023-06-22 LAB
BACTERIA UR CULT: ABNORMAL
BACTERIA UR CULT: ABNORMAL

## 2023-08-04 ENCOUNTER — TELEPHONE (OUTPATIENT)
Dept: OBSTETRICS AND GYNECOLOGY | Age: 33
End: 2023-08-04
Payer: COMMERCIAL

## 2023-08-21 ENCOUNTER — ROUTINE PRENATAL (OUTPATIENT)
Dept: OBSTETRICS AND GYNECOLOGY | Age: 33
End: 2023-08-21
Payer: COMMERCIAL

## 2023-08-21 VITALS — BODY MASS INDEX: 19.33 KG/M2 | WEIGHT: 99 LBS | SYSTOLIC BLOOD PRESSURE: 98 MMHG | DIASTOLIC BLOOD PRESSURE: 60 MMHG

## 2023-08-21 DIAGNOSIS — O09.299 HISTORY OF MATERNAL FOURTH DEGREE PERINEAL LACERATION, CURRENTLY PREGNANT: ICD-10-CM

## 2023-08-21 DIAGNOSIS — E03.8 SUBCLINICAL HYPOTHYROIDISM: ICD-10-CM

## 2023-08-21 DIAGNOSIS — Z34.90 PREGNANCY, UNSPECIFIED GESTATIONAL AGE: ICD-10-CM

## 2023-08-21 DIAGNOSIS — Z13.89 SCREENING FOR BLOOD OR PROTEIN IN URINE: Primary | ICD-10-CM

## 2023-08-21 LAB
GLUCOSE UR STRIP-MCNC: NEGATIVE MG/DL
PROT UR STRIP-MCNC: NEGATIVE MG/DL

## 2023-08-21 PROCEDURE — 0502F SUBSEQUENT PRENATAL CARE: CPT | Performed by: OBSTETRICS & GYNECOLOGY

## 2023-08-21 RX ORDER — KETOROLAC TROMETHAMINE 15 MG/ML
30 INJECTION, SOLUTION INTRAMUSCULAR; INTRAVENOUS ONCE
OUTPATIENT
Start: 2023-08-21 | End: 2023-08-26

## 2023-08-21 RX ORDER — OXYTOCIN/0.9 % SODIUM CHLORIDE 30/500 ML
250 PLASTIC BAG, INJECTION (ML) INTRAVENOUS CONTINUOUS
OUTPATIENT
Start: 2023-08-21 | End: 2023-08-21

## 2023-08-21 RX ORDER — METHYLERGONOVINE MALEATE 0.2 MG/ML
200 INJECTION INTRAVENOUS AS NEEDED
OUTPATIENT
Start: 2023-08-21

## 2023-08-21 RX ORDER — PHENOL 1.4 %
600 AEROSOL, SPRAY (ML) MUCOUS MEMBRANE DAILY
COMMUNITY

## 2023-08-21 RX ORDER — ACETAMINOPHEN 500 MG
1000 TABLET ORAL ONCE
OUTPATIENT
Start: 2023-08-21 | End: 2023-08-21

## 2023-08-21 RX ORDER — MISOPROSTOL 100 UG/1
800 TABLET ORAL AS NEEDED
OUTPATIENT
Start: 2023-08-21

## 2023-08-21 RX ORDER — OXYTOCIN/0.9 % SODIUM CHLORIDE 30/500 ML
999 PLASTIC BAG, INJECTION (ML) INTRAVENOUS ONCE
OUTPATIENT
Start: 2023-08-21

## 2023-08-21 RX ORDER — ONDANSETRON 4 MG/1
4 TABLET, FILM COATED ORAL EVERY 6 HOURS PRN
OUTPATIENT
Start: 2023-08-21

## 2023-08-21 RX ORDER — SODIUM CHLORIDE, SODIUM LACTATE, POTASSIUM CHLORIDE, CALCIUM CHLORIDE 600; 310; 30; 20 MG/100ML; MG/100ML; MG/100ML; MG/100ML
125 INJECTION, SOLUTION INTRAVENOUS CONTINUOUS
OUTPATIENT
Start: 2023-08-21

## 2023-08-21 RX ORDER — SODIUM CHLORIDE 0.9 % (FLUSH) 0.9 %
10 SYRINGE (ML) INJECTION EVERY 12 HOURS SCHEDULED
OUTPATIENT
Start: 2023-08-21

## 2023-08-21 RX ORDER — SODIUM CHLORIDE 9 MG/ML
40 INJECTION, SOLUTION INTRAVENOUS AS NEEDED
OUTPATIENT
Start: 2023-08-21

## 2023-08-21 RX ORDER — ONDANSETRON 2 MG/ML
4 INJECTION INTRAMUSCULAR; INTRAVENOUS EVERY 6 HOURS PRN
OUTPATIENT
Start: 2023-08-21

## 2023-08-21 RX ORDER — LIDOCAINE HYDROCHLORIDE 10 MG/ML
0.5 INJECTION, SOLUTION INFILTRATION; PERINEURAL ONCE AS NEEDED
OUTPATIENT
Start: 2023-08-21

## 2023-08-21 RX ORDER — CARBOPROST TROMETHAMINE 250 UG/ML
250 INJECTION, SOLUTION INTRAMUSCULAR AS NEEDED
OUTPATIENT
Start: 2023-08-21

## 2023-08-21 RX ORDER — SODIUM CHLORIDE 0.9 % (FLUSH) 0.9 %
10 SYRINGE (ML) INJECTION AS NEEDED
OUTPATIENT
Start: 2023-08-21

## 2023-08-21 NOTE — PROGRESS NOTES
The patient complains of a rash that appears on her inner thighs in the morning but is gone by the afternoon.  Her  shows me a cell phone picture.  She does report that she had a bug bite in the area.  They have not tried anything yet.  She is also having some leg cramps.  She has not felt fetal movement yet.    Weight gain for pregnancy is low but patient did gain weight since her last visit.  Total weight gain for pregnancy is 1 pound  Blood pressure 98/60 with no protein  Doppler heart tones are positive  First trimester testing is normal.  Baby is a boy    Assessment-17 weeks  Leg cramps-check CBC today and try stretches  Rash suspect due to bug bite.  Patient will try hydrocortisone and Benadryl and if not improved see Derm  GBS positive  Prior fourth degree laceration-patient desires primary  with no tubal ligation.  aFP today  Hypothyroidism-check TSH today.

## 2023-08-22 PROBLEM — O99.019 MATERNAL ANEMIA IN PREGNANCY, ANTEPARTUM: Status: ACTIVE | Noted: 2023-08-22

## 2023-08-22 LAB
ERYTHROCYTE [DISTWIDTH] IN BLOOD BY AUTOMATED COUNT: 13.1 % (ref 12.3–15.4)
HCT VFR BLD AUTO: 35.1 % (ref 34–46.6)
HGB BLD-MCNC: 11.5 G/DL (ref 12–15.9)
MCH RBC QN AUTO: 30.3 PG (ref 26.6–33)
MCHC RBC AUTO-ENTMCNC: 32.8 G/DL (ref 31.5–35.7)
MCV RBC AUTO: 92.4 FL (ref 79–97)
PLATELET # BLD AUTO: 214 10*3/MM3 (ref 140–450)
RBC # BLD AUTO: 3.8 10*6/MM3 (ref 3.77–5.28)
TSH SERPL DL<=0.005 MIU/L-ACNC: 2.78 UIU/ML (ref 0.27–4.2)
WBC # BLD AUTO: 10.16 10*3/MM3 (ref 3.4–10.8)

## 2023-08-22 RX ORDER — FERROUS SULFATE 325(65) MG
325 TABLET ORAL DAILY
Qty: 30 TABLET | Refills: 4 | Status: SHIPPED | OUTPATIENT
Start: 2023-08-22

## 2023-08-23 LAB
AFP INTERP SERPL-IMP: NORMAL
AFP INTERP SERPL-IMP: NORMAL
AFP MOM SERPL: 0.88
AFP SERPL-MCNC: 48.9 NG/ML
AGE AT DELIVERY: 34.9 YR
GA METHOD: NORMAL
GA: 17.4 WEEKS
IDDM PATIENT QL: NORMAL
LABORATORY COMMENT REPORT: NORMAL
MULTIPLE PREGNANCY: NO
NEURAL TUBE DEFECT RISK FETUS: NORMAL %
RESULT: NORMAL

## 2023-09-06 RX ORDER — LEVOTHYROXINE SODIUM 0.03 MG/1
25 TABLET ORAL DAILY
Qty: 90 TABLET | Refills: 3 | Status: SHIPPED | OUTPATIENT
Start: 2023-09-06

## 2023-09-12 ENCOUNTER — ROUTINE PRENATAL (OUTPATIENT)
Dept: OBSTETRICS AND GYNECOLOGY | Age: 33
End: 2023-09-12
Payer: COMMERCIAL

## 2023-09-12 VITALS — DIASTOLIC BLOOD PRESSURE: 62 MMHG | SYSTOLIC BLOOD PRESSURE: 100 MMHG | BODY MASS INDEX: 20.04 KG/M2 | WEIGHT: 102.6 LBS

## 2023-09-12 DIAGNOSIS — E03.8 SUBCLINICAL HYPOTHYROIDISM: ICD-10-CM

## 2023-09-12 DIAGNOSIS — B95.1 GROUP B STREPTOCOCCUS URINARY TRACT INFECTION AFFECTING PREGNANCY, ANTEPARTUM: ICD-10-CM

## 2023-09-12 DIAGNOSIS — O09.299 HISTORY OF MATERNAL FOURTH DEGREE PERINEAL LACERATION, CURRENTLY PREGNANT: ICD-10-CM

## 2023-09-12 DIAGNOSIS — Z36.86 ENCOUNTER FOR ANTENATAL SCREENING FOR CERVICAL LENGTH: ICD-10-CM

## 2023-09-12 DIAGNOSIS — Z36.89 ENCOUNTER FOR FETAL ANATOMIC SURVEY: ICD-10-CM

## 2023-09-12 DIAGNOSIS — O99.019 MATERNAL ANEMIA IN PREGNANCY, ANTEPARTUM: ICD-10-CM

## 2023-09-12 DIAGNOSIS — Z3A.20 20 WEEKS GESTATION OF PREGNANCY: Primary | ICD-10-CM

## 2023-09-12 DIAGNOSIS — Z34.90 PREGNANCY, UNSPECIFIED GESTATIONAL AGE: ICD-10-CM

## 2023-09-12 DIAGNOSIS — O23.40 GROUP B STREPTOCOCCUS URINARY TRACT INFECTION AFFECTING PREGNANCY, ANTEPARTUM: ICD-10-CM

## 2023-09-12 LAB
GLUCOSE UR STRIP-MCNC: NEGATIVE MG/DL
PROT UR STRIP-MCNC: NEGATIVE MG/DL

## 2023-09-12 NOTE — PROGRESS NOTES
Patient is here today for anatomy ultrasound.  She is feeling good fetal movements.  She did just get over a hemorrhoid.  She is taking iron and using Preparation H.  She wanted to know if there would be another way to do the iron.    Ultrasound shows normal anatomy within the limits of ultrasound.  Cervical length is 4.7 cm.  Baby is vertex.  Placenta is anterior with no previa.  Baby is a boy.  Size is equal to dates.  Weight gain for pregnancy is low  Blood pressure 100/62 with no protein  Hemoglobin was 11.5 and TSH was normal.    Assessment-20 weeks  Very mild anemia with hemorrhoids-offered to recheck the hemoglobin today but patient does not want to do that.  She will try the iron every other day and add fiber.  Hypothyroidism-continue Synthroid and recheck TSH at next visit  GBS is positive  History of fourth degree laceration-patient's primary  is scheduled for .

## 2023-10-09 ENCOUNTER — ROUTINE PRENATAL (OUTPATIENT)
Dept: OBSTETRICS AND GYNECOLOGY | Age: 33
End: 2023-10-09
Payer: COMMERCIAL

## 2023-10-09 VITALS — WEIGHT: 108 LBS | SYSTOLIC BLOOD PRESSURE: 110 MMHG | DIASTOLIC BLOOD PRESSURE: 74 MMHG | BODY MASS INDEX: 21.09 KG/M2

## 2023-10-09 DIAGNOSIS — B95.1 GROUP B STREPTOCOCCUS URINARY TRACT INFECTION AFFECTING PREGNANCY, ANTEPARTUM: ICD-10-CM

## 2023-10-09 DIAGNOSIS — E03.8 SUBCLINICAL HYPOTHYROIDISM: ICD-10-CM

## 2023-10-09 DIAGNOSIS — Z34.90 PREGNANCY, UNSPECIFIED GESTATIONAL AGE: ICD-10-CM

## 2023-10-09 DIAGNOSIS — O99.019 MATERNAL ANEMIA IN PREGNANCY, ANTEPARTUM: ICD-10-CM

## 2023-10-09 DIAGNOSIS — O23.40 GROUP B STREPTOCOCCUS URINARY TRACT INFECTION AFFECTING PREGNANCY, ANTEPARTUM: ICD-10-CM

## 2023-10-09 DIAGNOSIS — O09.299 HISTORY OF MATERNAL FOURTH DEGREE PERINEAL LACERATION, CURRENTLY PREGNANT: ICD-10-CM

## 2023-10-09 DIAGNOSIS — Z13.89 SCREENING FOR BLOOD OR PROTEIN IN URINE: Primary | ICD-10-CM

## 2023-10-09 LAB
BILIRUB BLD-MCNC: NEGATIVE MG/DL
CLARITY, POC: CLEAR
COLOR UR: YELLOW
GLUCOSE UR STRIP-MCNC: NEGATIVE MG/DL
GLUCOSE UR STRIP-MCNC: NEGATIVE MG/DL
KETONES UR QL: NEGATIVE
LEUKOCYTE EST, POC: NEGATIVE
NITRITE UR-MCNC: NEGATIVE MG/ML
PH UR: 6.5 [PH] (ref 5–8)
PROT UR STRIP-MCNC: NEGATIVE MG/DL
PROT UR STRIP-MCNC: NEGATIVE MG/DL
RBC # UR STRIP: NEGATIVE /UL
SP GR UR: 1.01 (ref 1–1.03)
UROBILINOGEN UR QL: NORMAL

## 2023-10-09 RX ORDER — OMEPRAZOLE 40 MG/1
40 CAPSULE, DELAYED RELEASE ORAL DAILY
Qty: 30 CAPSULE | Refills: 5 | Status: SHIPPED | OUTPATIENT
Start: 2023-10-09

## 2023-10-09 NOTE — PROGRESS NOTES
Patient is feeling good fetal movements.  She is sometimes forgetting to take her iron.  She sometimes feels some irritation after urinating.  She reports she never took the antibiotics for the GBS in her urine previously.    Weight gain for pregnancy is low but increasing  Blood pressure 110/74 no protein  Doppler heart tones are positive and fundal height is appropriate.    Assessment-24 weeks  Mild anemia-patient taking iron every other day.  Recheck CBC today  Hypothyroidism-check TSH today  Recommend flu vaccine-patient is agreeable.  Recommend COVID-vaccine.  Patient reports she had a reaction where she felt achy afterwards.  Encouraged the patient still to get the vaccine  Prior fourth degree-plan for primary  with no tube ligation.  Check urine today

## 2023-10-10 LAB
ERYTHROCYTE [DISTWIDTH] IN BLOOD BY AUTOMATED COUNT: 13.1 % (ref 12.3–15.4)
HCT VFR BLD AUTO: 34 % (ref 34–46.6)
HGB BLD-MCNC: 11.2 G/DL (ref 12–15.9)
MCH RBC QN AUTO: 31 PG (ref 26.6–33)
MCHC RBC AUTO-ENTMCNC: 32.9 G/DL (ref 31.5–35.7)
MCV RBC AUTO: 94.2 FL (ref 79–97)
PLATELET # BLD AUTO: 212 10*3/MM3 (ref 140–450)
RBC # BLD AUTO: 3.61 10*6/MM3 (ref 3.77–5.28)
TSH SERPL DL<=0.005 MIU/L-ACNC: 2.83 UIU/ML (ref 0.27–4.2)
WBC # BLD AUTO: 11.78 10*3/MM3 (ref 3.4–10.8)

## 2023-10-12 LAB
BACTERIA UR CULT: ABNORMAL
BACTERIA UR CULT: ABNORMAL

## 2023-10-12 RX ORDER — CEPHALEXIN 500 MG/1
500 CAPSULE ORAL 3 TIMES DAILY
Qty: 15 CAPSULE | Refills: 0 | Status: SHIPPED | OUTPATIENT
Start: 2023-10-12 | End: 2023-10-17

## 2023-11-07 ENCOUNTER — ROUTINE PRENATAL (OUTPATIENT)
Dept: OBSTETRICS AND GYNECOLOGY | Age: 33
End: 2023-11-07
Payer: COMMERCIAL

## 2023-11-07 VITALS — WEIGHT: 111 LBS | SYSTOLIC BLOOD PRESSURE: 108 MMHG | DIASTOLIC BLOOD PRESSURE: 68 MMHG | BODY MASS INDEX: 21.68 KG/M2

## 2023-11-07 DIAGNOSIS — Z13.1 SCREENING FOR DIABETES MELLITUS: ICD-10-CM

## 2023-11-07 DIAGNOSIS — Z13.0 SCREENING FOR IRON DEFICIENCY ANEMIA: ICD-10-CM

## 2023-11-07 DIAGNOSIS — Z34.90 PREGNANCY, UNSPECIFIED GESTATIONAL AGE: ICD-10-CM

## 2023-11-07 DIAGNOSIS — O23.40 GROUP B STREPTOCOCCUS URINARY TRACT INFECTION AFFECTING PREGNANCY, ANTEPARTUM: ICD-10-CM

## 2023-11-07 DIAGNOSIS — O09.299 HISTORY OF MATERNAL FOURTH DEGREE PERINEAL LACERATION, CURRENTLY PREGNANT: ICD-10-CM

## 2023-11-07 DIAGNOSIS — E03.8 SUBCLINICAL HYPOTHYROIDISM: ICD-10-CM

## 2023-11-07 DIAGNOSIS — B95.1 GROUP B STREPTOCOCCUS URINARY TRACT INFECTION AFFECTING PREGNANCY, ANTEPARTUM: ICD-10-CM

## 2023-11-07 DIAGNOSIS — Z13.89 SCREENING FOR BLOOD OR PROTEIN IN URINE: Primary | ICD-10-CM

## 2023-11-07 DIAGNOSIS — O99.019 MATERNAL ANEMIA IN PREGNANCY, ANTEPARTUM: ICD-10-CM

## 2023-11-07 LAB
GLUCOSE UR STRIP-MCNC: NEGATIVE MG/DL
PROT UR STRIP-MCNC: NEGATIVE MG/DL

## 2023-11-07 NOTE — PROGRESS NOTES
Patient is taking her iron.  She is feeling good.  She is feeling fetal movements.    Doppler tones are positive and fundal height is appropriate  Weight gain for pregnancy is low  Blood pressure 108/68 with no protein.    Assessment-28 weeks  Third trimester labs and Tdap today  Blood type is B+  Hypothyroidism-recheck TSH today  Mild anemia-continue Slow Fe  Flu vaccine has been given  Prior fourth degree-plan for primary  with no tubal ligation on .

## 2023-11-08 LAB
ERYTHROCYTE [DISTWIDTH] IN BLOOD BY AUTOMATED COUNT: 13.1 % (ref 11.7–15.4)
GLUCOSE 1H P 50 G GLC PO SERPL-MCNC: 114 MG/DL (ref 70–139)
HCT VFR BLD AUTO: 35.6 % (ref 34–46.6)
HGB BLD-MCNC: 11.9 G/DL (ref 11.1–15.9)
MCH RBC QN AUTO: 31.5 PG (ref 26.6–33)
MCHC RBC AUTO-ENTMCNC: 33.4 G/DL (ref 31.5–35.7)
MCV RBC AUTO: 94 FL (ref 79–97)
PLATELET # BLD AUTO: 197 X10E3/UL (ref 150–450)
RBC # BLD AUTO: 3.78 X10E6/UL (ref 3.77–5.28)
TSH SERPL DL<=0.005 MIU/L-ACNC: 1.91 UIU/ML (ref 0.45–4.5)
WBC # BLD AUTO: 10.3 X10E3/UL (ref 3.4–10.8)

## 2023-11-20 ENCOUNTER — ROUTINE PRENATAL (OUTPATIENT)
Dept: OBSTETRICS AND GYNECOLOGY | Age: 33
End: 2023-11-20
Payer: COMMERCIAL

## 2023-11-20 VITALS — WEIGHT: 114 LBS | SYSTOLIC BLOOD PRESSURE: 108 MMHG | BODY MASS INDEX: 22.26 KG/M2 | DIASTOLIC BLOOD PRESSURE: 70 MMHG

## 2023-11-20 DIAGNOSIS — O99.019 MATERNAL ANEMIA IN PREGNANCY, ANTEPARTUM: ICD-10-CM

## 2023-11-20 DIAGNOSIS — Z13.89 SCREENING FOR BLOOD OR PROTEIN IN URINE: Primary | ICD-10-CM

## 2023-11-20 DIAGNOSIS — B95.1 GROUP B STREPTOCOCCUS URINARY TRACT INFECTION AFFECTING PREGNANCY, ANTEPARTUM: ICD-10-CM

## 2023-11-20 DIAGNOSIS — O09.299 HISTORY OF MATERNAL FOURTH DEGREE PERINEAL LACERATION, CURRENTLY PREGNANT: ICD-10-CM

## 2023-11-20 DIAGNOSIS — O23.40 GROUP B STREPTOCOCCUS URINARY TRACT INFECTION AFFECTING PREGNANCY, ANTEPARTUM: ICD-10-CM

## 2023-11-20 DIAGNOSIS — E03.8 SUBCLINICAL HYPOTHYROIDISM: ICD-10-CM

## 2023-11-20 LAB
GLUCOSE UR STRIP-MCNC: NEGATIVE MG/DL
PROT UR STRIP-MCNC: NEGATIVE MG/DL

## 2023-11-20 NOTE — PROGRESS NOTES
Patient is feeling good fetal movements and no complaints except for some mild lower back pain.  She is going to buy an abdominal binder for after her .    Ultrasound today shows estimated fetal weight of 3 pounds 12 ounces of 62nd percentile.  Abdominal circumference at the 83rd percentile.  Baby is vertex.  INDY is normal at 12.  Weight gain for pregnancy is still low but the patient is gaining weight.  Blood pressure 108/70 with no protein  Third trimester labs were normal except for mild anemia.  TSH was normal.    Assessment-30 weeks  Normal fetal weight today.  Hypothyroidism-recheck TSH at 34 weeks  Mild anemia-continue Slow Fe  We discussed  information.  Patient will take Tylenol the night before wash with a antibacterial soap and drink Gatorade as she is getting ready.

## 2023-12-07 ENCOUNTER — ROUTINE PRENATAL (OUTPATIENT)
Dept: OBSTETRICS AND GYNECOLOGY | Age: 33
End: 2023-12-07
Payer: COMMERCIAL

## 2023-12-07 VITALS — WEIGHT: 118.2 LBS | DIASTOLIC BLOOD PRESSURE: 80 MMHG | SYSTOLIC BLOOD PRESSURE: 122 MMHG | BODY MASS INDEX: 23.08 KG/M2

## 2023-12-07 DIAGNOSIS — O23.40 GROUP B STREPTOCOCCUS URINARY TRACT INFECTION AFFECTING PREGNANCY, ANTEPARTUM: ICD-10-CM

## 2023-12-07 DIAGNOSIS — Z13.89 SCREENING FOR BLOOD OR PROTEIN IN URINE: Primary | ICD-10-CM

## 2023-12-07 DIAGNOSIS — O09.299 HISTORY OF MATERNAL FOURTH DEGREE PERINEAL LACERATION, CURRENTLY PREGNANT: ICD-10-CM

## 2023-12-07 DIAGNOSIS — O99.019 MATERNAL ANEMIA IN PREGNANCY, ANTEPARTUM: ICD-10-CM

## 2023-12-07 DIAGNOSIS — E03.8 SUBCLINICAL HYPOTHYROIDISM: ICD-10-CM

## 2023-12-07 DIAGNOSIS — Z34.90 PREGNANCY, UNSPECIFIED GESTATIONAL AGE: ICD-10-CM

## 2023-12-07 DIAGNOSIS — B95.1 GROUP B STREPTOCOCCUS URINARY TRACT INFECTION AFFECTING PREGNANCY, ANTEPARTUM: ICD-10-CM

## 2023-12-07 LAB
GLUCOSE UR STRIP-MCNC: NEGATIVE MG/DL
PROT UR STRIP-MCNC: NEGATIVE MG/DL

## 2023-12-07 NOTE — PROGRESS NOTES
The patient reports she is feeling well.  Baby is active.    Doppler heart tones are positive and fundal height is appropriate  Weight gain is now normal for pregnancy  Blood pressure 122/80 with no protein    Assessment-32 weeks  Hypothyroidism-recheck TSH at 34 weeks  Mild anemia-continue Slow Fe  History of fourth degree [id number] is scheduled for   Recommend kick counts  Recommend RSV vaccination-patient declines.  GBS positive

## 2023-12-21 ENCOUNTER — ROUTINE PRENATAL (OUTPATIENT)
Dept: OBSTETRICS AND GYNECOLOGY | Age: 33
End: 2023-12-21
Payer: COMMERCIAL

## 2023-12-21 VITALS — DIASTOLIC BLOOD PRESSURE: 74 MMHG | SYSTOLIC BLOOD PRESSURE: 116 MMHG | WEIGHT: 118 LBS | BODY MASS INDEX: 23.05 KG/M2

## 2023-12-21 DIAGNOSIS — O23.40 GROUP B STREPTOCOCCUS URINARY TRACT INFECTION AFFECTING PREGNANCY, ANTEPARTUM: ICD-10-CM

## 2023-12-21 DIAGNOSIS — Z13.89 SCREENING FOR BLOOD OR PROTEIN IN URINE: Primary | ICD-10-CM

## 2023-12-21 DIAGNOSIS — Z34.90 PREGNANCY, UNSPECIFIED GESTATIONAL AGE: ICD-10-CM

## 2023-12-21 DIAGNOSIS — O99.019 MATERNAL ANEMIA IN PREGNANCY, ANTEPARTUM: ICD-10-CM

## 2023-12-21 DIAGNOSIS — B95.1 GROUP B STREPTOCOCCUS URINARY TRACT INFECTION AFFECTING PREGNANCY, ANTEPARTUM: ICD-10-CM

## 2023-12-21 DIAGNOSIS — O09.299 HISTORY OF MATERNAL FOURTH DEGREE PERINEAL LACERATION, CURRENTLY PREGNANT: ICD-10-CM

## 2023-12-21 DIAGNOSIS — E03.8 SUBCLINICAL HYPOTHYROIDISM: ICD-10-CM

## 2023-12-21 LAB
GLUCOSE UR STRIP-MCNC: NEGATIVE MG/DL
PROT UR STRIP-MCNC: NEGATIVE MG/DL

## 2023-12-21 NOTE — PROGRESS NOTES
The patient has had some hot flashes.  Baby is moving very actively.    Doppler heart tones are positive and fundal height is appropriate  Blood pressure 116/74 with no protein  Weight gain for pregnancy is slightly low.    Assessment-34 weeks  Hypothyroidism-check TSH today  Mild anemia-continue Slow Fe and check CBC today  History of fourth degree [id number] is scheduled for   Recommend kick counts  Patient declines RSV vaccination  GBS positive

## 2023-12-22 LAB
ERYTHROCYTE [DISTWIDTH] IN BLOOD BY AUTOMATED COUNT: 12.6 % (ref 12.3–15.4)
HCT VFR BLD AUTO: 37.6 % (ref 34–46.6)
HGB BLD-MCNC: 12.5 G/DL (ref 12–15.9)
MCH RBC QN AUTO: 31.4 PG (ref 26.6–33)
MCHC RBC AUTO-ENTMCNC: 33.2 G/DL (ref 31.5–35.7)
MCV RBC AUTO: 94.5 FL (ref 79–97)
PLATELET # BLD AUTO: 168 10*3/MM3 (ref 140–450)
RBC # BLD AUTO: 3.98 10*6/MM3 (ref 3.77–5.28)
TSH SERPL DL<=0.005 MIU/L-ACNC: 2.4 UIU/ML (ref 0.27–4.2)
WBC # BLD AUTO: 9.46 10*3/MM3 (ref 3.4–10.8)

## 2024-01-03 ENCOUNTER — ROUTINE PRENATAL (OUTPATIENT)
Dept: OBSTETRICS AND GYNECOLOGY | Age: 34
End: 2024-01-03
Payer: COMMERCIAL

## 2024-01-03 VITALS — SYSTOLIC BLOOD PRESSURE: 108 MMHG | DIASTOLIC BLOOD PRESSURE: 66 MMHG | WEIGHT: 121 LBS | BODY MASS INDEX: 23.63 KG/M2

## 2024-01-03 DIAGNOSIS — E03.8 SUBCLINICAL HYPOTHYROIDISM: ICD-10-CM

## 2024-01-03 DIAGNOSIS — Z13.89 SCREENING FOR BLOOD OR PROTEIN IN URINE: Primary | ICD-10-CM

## 2024-01-03 DIAGNOSIS — Z34.90 PREGNANCY, UNSPECIFIED GESTATIONAL AGE: ICD-10-CM

## 2024-01-03 DIAGNOSIS — O09.299 HISTORY OF MATERNAL FOURTH DEGREE PERINEAL LACERATION, CURRENTLY PREGNANT: ICD-10-CM

## 2024-01-03 DIAGNOSIS — O99.019 MATERNAL ANEMIA IN PREGNANCY, ANTEPARTUM: ICD-10-CM

## 2024-01-03 DIAGNOSIS — O23.40 GROUP B STREPTOCOCCUS URINARY TRACT INFECTION AFFECTING PREGNANCY, ANTEPARTUM: ICD-10-CM

## 2024-01-03 DIAGNOSIS — B95.1 GROUP B STREPTOCOCCUS URINARY TRACT INFECTION AFFECTING PREGNANCY, ANTEPARTUM: ICD-10-CM

## 2024-01-03 LAB
GLUCOSE UR STRIP-MCNC: NEGATIVE MG/DL
PROT UR STRIP-MCNC: NEGATIVE MG/DL

## 2024-01-03 NOTE — PROGRESS NOTES
Patient is feeling good fetal movements.  No vaginal bleeding or regular painful contractions.  She is worried about feeling hypotensive before the  or having low blood sugar.    Doppler tones are positive and fundal height is appropriate  Blood pressure 108/66 with no protein  Cervix is closed and thick  Weight gain for pregnancy is slightly low    Assessment-36 weeks 5 days  Hypothyroidism-TSH was normal  Mild anemia-improving.  Continue Slow Fe  History of fourth degree [id number] is scheduled for .  We discussed section in detail today  Recommend kick counts  Patient is GBS positive  Patient did dilate significantly at 38 weeks with last pregnancy.

## 2024-01-11 ENCOUNTER — ROUTINE PRENATAL (OUTPATIENT)
Dept: OBSTETRICS AND GYNECOLOGY | Age: 34
End: 2024-01-11
Payer: COMMERCIAL

## 2024-01-11 VITALS — SYSTOLIC BLOOD PRESSURE: 108 MMHG | DIASTOLIC BLOOD PRESSURE: 70 MMHG | WEIGHT: 121 LBS | BODY MASS INDEX: 23.63 KG/M2

## 2024-01-11 DIAGNOSIS — E03.8 SUBCLINICAL HYPOTHYROIDISM: ICD-10-CM

## 2024-01-11 DIAGNOSIS — O99.019 MATERNAL ANEMIA IN PREGNANCY, ANTEPARTUM: ICD-10-CM

## 2024-01-11 DIAGNOSIS — Z13.89 SCREENING FOR BLOOD OR PROTEIN IN URINE: Primary | ICD-10-CM

## 2024-01-11 DIAGNOSIS — B95.1 GROUP B STREPTOCOCCUS URINARY TRACT INFECTION AFFECTING PREGNANCY, ANTEPARTUM: ICD-10-CM

## 2024-01-11 DIAGNOSIS — Z34.90 PREGNANCY, UNSPECIFIED GESTATIONAL AGE: ICD-10-CM

## 2024-01-11 DIAGNOSIS — O09.299 HISTORY OF MATERNAL FOURTH DEGREE PERINEAL LACERATION, CURRENTLY PREGNANT: ICD-10-CM

## 2024-01-11 DIAGNOSIS — O23.40 GROUP B STREPTOCOCCUS URINARY TRACT INFECTION AFFECTING PREGNANCY, ANTEPARTUM: ICD-10-CM

## 2024-01-11 LAB
GLUCOSE UR STRIP-MCNC: NEGATIVE MG/DL
PROT UR STRIP-MCNC: NEGATIVE MG/DL

## 2024-01-11 NOTE — PROGRESS NOTES
The patient is feeling good fetal movements.  She is feeling full after eating and is experiencing some reflux.    Cervix is closed thick and posterior  Blood pressure 108/70 with no protein  Doppler tones are positive and fundal height is appropriate.    Assessment-37 weeks 6 days  Reflux-recommend patient take Prilosec and eat smaller meals  Hypothyroidism-TSH was normal  Mild anemia-continue Slow Fe  History of fourth degree [id number] is scheduled for .  Kick counts recommended

## 2024-01-14 ENCOUNTER — ANESTHESIA EVENT (OUTPATIENT)
Dept: LABOR AND DELIVERY | Facility: HOSPITAL | Age: 34
End: 2024-01-14
Payer: COMMERCIAL

## 2024-01-14 ENCOUNTER — ANESTHESIA (OUTPATIENT)
Dept: LABOR AND DELIVERY | Facility: HOSPITAL | Age: 34
End: 2024-01-14
Payer: COMMERCIAL

## 2024-01-14 ENCOUNTER — HOSPITAL ENCOUNTER (INPATIENT)
Facility: HOSPITAL | Age: 34
LOS: 4 days | Discharge: HOME | End: 2024-01-18
Attending: OBSTETRICS & GYNECOLOGY | Admitting: OBSTETRICS & GYNECOLOGY
Payer: COMMERCIAL

## 2024-01-14 DIAGNOSIS — L73.9 FOLLICULITIS: Primary | ICD-10-CM

## 2024-01-14 DIAGNOSIS — O09.299 HISTORY OF MATERNAL FOURTH DEGREE PERINEAL LACERATION, CURRENTLY PREGNANT: ICD-10-CM

## 2024-01-14 PROBLEM — Z87.59 HISTORY OF FOURTH DEGREE PERINEAL LACERATION: Status: ACTIVE | Noted: 2024-01-14

## 2024-01-14 PROBLEM — Z87.59 HISTORY OF FOURTH DEGREE PERINEAL LACERATION: Status: RESOLVED | Noted: 2024-01-14 | Resolved: 2024-01-14

## 2024-01-14 LAB
ABO GROUP BLD: NORMAL
BLD GP AB SCN SERPL QL: NEGATIVE
DEPRECATED RDW RBC AUTO: 44.1 FL (ref 37–54)
ERYTHROCYTE [DISTWIDTH] IN BLOOD BY AUTOMATED COUNT: 12.8 % (ref 12.3–15.4)
HCT VFR BLD AUTO: 39 % (ref 34–46.6)
HGB BLD-MCNC: 12.7 G/DL (ref 12–15.9)
MCH RBC QN AUTO: 30.5 PG (ref 26.6–33)
MCHC RBC AUTO-ENTMCNC: 32.6 G/DL (ref 31.5–35.7)
MCV RBC AUTO: 93.8 FL (ref 79–97)
PLATELET # BLD AUTO: 155 10*3/MM3 (ref 140–450)
PMV BLD AUTO: 11.3 FL (ref 6–12)
RBC # BLD AUTO: 4.16 10*6/MM3 (ref 3.77–5.28)
RH BLD: POSITIVE
RPR SER QL: NORMAL
T&S EXPIRATION DATE: NORMAL
WBC NRBC COR # BLD AUTO: 11.13 10*3/MM3 (ref 3.4–10.8)

## 2024-01-14 PROCEDURE — 86901 BLOOD TYPING SEROLOGIC RH(D): CPT | Performed by: OBSTETRICS & GYNECOLOGY

## 2024-01-14 PROCEDURE — 59510 CESAREAN DELIVERY: CPT | Performed by: STUDENT IN AN ORGANIZED HEALTH CARE EDUCATION/TRAINING PROGRAM

## 2024-01-14 PROCEDURE — 25010000002 PHENYLEPHRINE 10 MG/ML SOLUTION: Performed by: STUDENT IN AN ORGANIZED HEALTH CARE EDUCATION/TRAINING PROGRAM

## 2024-01-14 PROCEDURE — 25010000002 MORPHINE PER 10 MG: Performed by: STUDENT IN AN ORGANIZED HEALTH CARE EDUCATION/TRAINING PROGRAM

## 2024-01-14 PROCEDURE — 25810000003 LACTATED RINGERS SOLUTION: Performed by: OBSTETRICS & GYNECOLOGY

## 2024-01-14 PROCEDURE — 25010000002 BUPIVACAINE PF 0.75 % SOLUTION: Performed by: STUDENT IN AN ORGANIZED HEALTH CARE EDUCATION/TRAINING PROGRAM

## 2024-01-14 PROCEDURE — 85027 COMPLETE CBC AUTOMATED: CPT | Performed by: OBSTETRICS & GYNECOLOGY

## 2024-01-14 PROCEDURE — 86592 SYPHILIS TEST NON-TREP QUAL: CPT | Performed by: OBSTETRICS & GYNECOLOGY

## 2024-01-14 PROCEDURE — 25010000002 ROPIVACAINE PER 1 MG: Performed by: STUDENT IN AN ORGANIZED HEALTH CARE EDUCATION/TRAINING PROGRAM

## 2024-01-14 PROCEDURE — 86850 RBC ANTIBODY SCREEN: CPT | Performed by: OBSTETRICS & GYNECOLOGY

## 2024-01-14 PROCEDURE — 99202 OFFICE O/P NEW SF 15 MIN: CPT | Performed by: OBSTETRICS & GYNECOLOGY

## 2024-01-14 PROCEDURE — 25010000002 ONDANSETRON PER 1 MG: Performed by: OBSTETRICS & GYNECOLOGY

## 2024-01-14 PROCEDURE — 25010000002 CLONIDINE PER 1 MG: Performed by: STUDENT IN AN ORGANIZED HEALTH CARE EDUCATION/TRAINING PROGRAM

## 2024-01-14 PROCEDURE — 25010000002 CEFAZOLIN IN DEXTROSE 2-4 GM/100ML-% SOLUTION: Performed by: OBSTETRICS & GYNECOLOGY

## 2024-01-14 PROCEDURE — 25010000002 EPINEPHRINE 1 MG/ML SOLUTION 30 ML VIAL: Performed by: STUDENT IN AN ORGANIZED HEALTH CARE EDUCATION/TRAINING PROGRAM

## 2024-01-14 PROCEDURE — 25010000002 FENTANYL CITRATE (PF) 50 MCG/ML SOLUTION: Performed by: STUDENT IN AN ORGANIZED HEALTH CARE EDUCATION/TRAINING PROGRAM

## 2024-01-14 PROCEDURE — 25810000003 LACTATED RINGERS PER 1000 ML: Performed by: OBSTETRICS & GYNECOLOGY

## 2024-01-14 PROCEDURE — 86900 BLOOD TYPING SEROLOGIC ABO: CPT | Performed by: OBSTETRICS & GYNECOLOGY

## 2024-01-14 PROCEDURE — 25010000002 KETOROLAC TROMETHAMINE PER 15 MG: Performed by: OBSTETRICS & GYNECOLOGY

## 2024-01-14 RX ORDER — HYDROCORTISONE 25 MG/G
CREAM TOPICAL 3 TIMES DAILY PRN
Status: DISCONTINUED | OUTPATIENT
Start: 2024-01-14 | End: 2024-01-18 | Stop reason: HOSPADM

## 2024-01-14 RX ORDER — SODIUM CHLORIDE 0.9 % (FLUSH) 0.9 %
10 SYRINGE (ML) INJECTION AS NEEDED
Status: DISCONTINUED | OUTPATIENT
Start: 2024-01-14 | End: 2024-01-14 | Stop reason: HOSPADM

## 2024-01-14 RX ORDER — PHYTONADIONE 1 MG/.5ML
INJECTION, EMULSION INTRAMUSCULAR; INTRAVENOUS; SUBCUTANEOUS
Status: ACTIVE
Start: 2024-01-14 | End: 2024-01-14

## 2024-01-14 RX ORDER — MORPHINE SULFATE 4 MG/ML
INJECTION, SOLUTION INTRAMUSCULAR; INTRAVENOUS
Status: COMPLETED | OUTPATIENT
Start: 2024-01-14 | End: 2024-01-14

## 2024-01-14 RX ORDER — DOCUSATE SODIUM 100 MG/1
100 CAPSULE, LIQUID FILLED ORAL 2 TIMES DAILY
Status: DISCONTINUED | OUTPATIENT
Start: 2024-01-14 | End: 2024-01-18 | Stop reason: HOSPADM

## 2024-01-14 RX ORDER — ACETAMINOPHEN 325 MG/1
650 TABLET ORAL EVERY 6 HOURS
Status: DISCONTINUED | OUTPATIENT
Start: 2024-01-15 | End: 2024-01-18 | Stop reason: HOSPADM

## 2024-01-14 RX ORDER — HYDROXYZINE 50 MG/1
50 TABLET, FILM COATED ORAL EVERY 6 HOURS PRN
Status: DISCONTINUED | OUTPATIENT
Start: 2024-01-14 | End: 2024-01-18 | Stop reason: HOSPADM

## 2024-01-14 RX ORDER — DROPERIDOL 2.5 MG/ML
0.62 INJECTION, SOLUTION INTRAMUSCULAR; INTRAVENOUS
Status: DISCONTINUED | OUTPATIENT
Start: 2024-01-14 | End: 2024-01-18 | Stop reason: HOSPADM

## 2024-01-14 RX ORDER — OXYTOCIN/0.9 % SODIUM CHLORIDE 30/500 ML
125 PLASTIC BAG, INJECTION (ML) INTRAVENOUS CONTINUOUS PRN
Status: COMPLETED | OUTPATIENT
Start: 2024-01-14 | End: 2024-01-14

## 2024-01-14 RX ORDER — ERYTHROMYCIN 5 MG/G
OINTMENT OPHTHALMIC
Status: ACTIVE
Start: 2024-01-14 | End: 2024-01-14

## 2024-01-14 RX ORDER — FENTANYL CITRATE 50 UG/ML
INJECTION, SOLUTION INTRAMUSCULAR; INTRAVENOUS
Status: COMPLETED | OUTPATIENT
Start: 2024-01-14 | End: 2024-01-14

## 2024-01-14 RX ORDER — ACETAMINOPHEN 500 MG
1000 TABLET ORAL EVERY 6 HOURS
Status: COMPLETED | OUTPATIENT
Start: 2024-01-14 | End: 2024-01-15

## 2024-01-14 RX ORDER — LIDOCAINE HYDROCHLORIDE 10 MG/ML
0.5 INJECTION, SOLUTION INFILTRATION; PERINEURAL ONCE AS NEEDED
Status: DISCONTINUED | OUTPATIENT
Start: 2024-01-14 | End: 2024-01-14 | Stop reason: HOSPADM

## 2024-01-14 RX ORDER — SODIUM CHLORIDE 0.9 % (FLUSH) 0.9 %
10 SYRINGE (ML) INJECTION EVERY 12 HOURS SCHEDULED
Status: DISCONTINUED | OUTPATIENT
Start: 2024-01-14 | End: 2024-01-14 | Stop reason: HOSPADM

## 2024-01-14 RX ORDER — OXYTOCIN/0.9 % SODIUM CHLORIDE 30/500 ML
125 PLASTIC BAG, INJECTION (ML) INTRAVENOUS CONTINUOUS PRN
Status: DISCONTINUED | OUTPATIENT
Start: 2024-01-14 | End: 2024-01-18 | Stop reason: HOSPADM

## 2024-01-14 RX ORDER — SODIUM CHLORIDE 9 MG/ML
40 INJECTION, SOLUTION INTRAVENOUS AS NEEDED
Status: DISCONTINUED | OUTPATIENT
Start: 2024-01-14 | End: 2024-01-14 | Stop reason: HOSPADM

## 2024-01-14 RX ORDER — HYDROMORPHONE HYDROCHLORIDE 1 MG/ML
0.5 INJECTION, SOLUTION INTRAMUSCULAR; INTRAVENOUS; SUBCUTANEOUS
Status: CANCELLED | OUTPATIENT
Start: 2024-01-14 | End: 2024-01-14

## 2024-01-14 RX ORDER — ONDANSETRON 4 MG/1
4 TABLET, FILM COATED ORAL EVERY 6 HOURS PRN
Status: DISCONTINUED | OUTPATIENT
Start: 2024-01-14 | End: 2024-01-14 | Stop reason: HOSPADM

## 2024-01-14 RX ORDER — OXYCODONE HYDROCHLORIDE 10 MG/1
10 TABLET ORAL EVERY 4 HOURS PRN
Status: DISCONTINUED | OUTPATIENT
Start: 2024-01-14 | End: 2024-01-18 | Stop reason: HOSPADM

## 2024-01-14 RX ORDER — ONDANSETRON 2 MG/ML
4 INJECTION INTRAMUSCULAR; INTRAVENOUS ONCE AS NEEDED
Status: DISCONTINUED | OUTPATIENT
Start: 2024-01-14 | End: 2024-01-18 | Stop reason: HOSPADM

## 2024-01-14 RX ORDER — KETOROLAC TROMETHAMINE 15 MG/ML
15 INJECTION, SOLUTION INTRAMUSCULAR; INTRAVENOUS EVERY 6 HOURS
Status: COMPLETED | OUTPATIENT
Start: 2024-01-14 | End: 2024-01-15

## 2024-01-14 RX ORDER — CALCIUM CARBONATE 500 MG/1
1 TABLET, CHEWABLE ORAL DAILY
COMMUNITY
End: 2024-01-22

## 2024-01-14 RX ORDER — ACETAMINOPHEN 500 MG
1000 TABLET ORAL ONCE
Status: COMPLETED | OUTPATIENT
Start: 2024-01-14 | End: 2024-01-14

## 2024-01-14 RX ORDER — ONDANSETRON 2 MG/ML
4 INJECTION INTRAMUSCULAR; INTRAVENOUS EVERY 6 HOURS PRN
Status: DISCONTINUED | OUTPATIENT
Start: 2024-01-14 | End: 2024-01-18 | Stop reason: HOSPADM

## 2024-01-14 RX ORDER — METHYLERGONOVINE MALEATE 0.2 MG/ML
200 INJECTION INTRAVENOUS AS NEEDED
Status: DISCONTINUED | OUTPATIENT
Start: 2024-01-14 | End: 2024-01-14 | Stop reason: HOSPADM

## 2024-01-14 RX ORDER — MORPHINE SULFATE 2 MG/ML
2 INJECTION, SOLUTION INTRAMUSCULAR; INTRAVENOUS
Status: ACTIVE | OUTPATIENT
Start: 2024-01-14 | End: 2024-01-14

## 2024-01-14 RX ORDER — DIPHENHYDRAMINE HYDROCHLORIDE 50 MG/ML
25 INJECTION INTRAMUSCULAR; INTRAVENOUS EVERY 4 HOURS PRN
Status: DISCONTINUED | OUTPATIENT
Start: 2024-01-14 | End: 2024-01-18 | Stop reason: HOSPADM

## 2024-01-14 RX ORDER — CEFAZOLIN SODIUM 2 G/100ML
2 INJECTION, SOLUTION INTRAVENOUS ONCE
Status: COMPLETED | OUTPATIENT
Start: 2024-01-14 | End: 2024-01-14

## 2024-01-14 RX ORDER — PHENYLEPHRINE HYDROCHLORIDE 10 MG/ML
INJECTION INTRAVENOUS AS NEEDED
Status: DISCONTINUED | OUTPATIENT
Start: 2024-01-14 | End: 2024-01-14 | Stop reason: SURG

## 2024-01-14 RX ORDER — MISOPROSTOL 200 UG/1
800 TABLET ORAL AS NEEDED
Status: DISCONTINUED | OUTPATIENT
Start: 2024-01-14 | End: 2024-01-14 | Stop reason: HOSPADM

## 2024-01-14 RX ORDER — OXYTOCIN/0.9 % SODIUM CHLORIDE 30/500 ML
250 PLASTIC BAG, INJECTION (ML) INTRAVENOUS CONTINUOUS
Status: ACTIVE | OUTPATIENT
Start: 2024-01-14 | End: 2024-01-14

## 2024-01-14 RX ORDER — ONDANSETRON 2 MG/ML
4 INJECTION INTRAMUSCULAR; INTRAVENOUS EVERY 6 HOURS PRN
Status: DISCONTINUED | OUTPATIENT
Start: 2024-01-14 | End: 2024-01-14 | Stop reason: HOSPADM

## 2024-01-14 RX ORDER — NALOXONE HCL 0.4 MG/ML
0.2 VIAL (ML) INJECTION
Status: DISCONTINUED | OUTPATIENT
Start: 2024-01-14 | End: 2024-01-18 | Stop reason: HOSPADM

## 2024-01-14 RX ORDER — ONDANSETRON 2 MG/ML
4 INJECTION INTRAMUSCULAR; INTRAVENOUS ONCE AS NEEDED
Status: DISCONTINUED | OUTPATIENT
Start: 2024-01-14 | End: 2024-01-14 | Stop reason: HOSPADM

## 2024-01-14 RX ORDER — LEVOTHYROXINE SODIUM 0.03 MG/1
25 TABLET ORAL
Status: DISCONTINUED | OUTPATIENT
Start: 2024-01-15 | End: 2024-01-18 | Stop reason: HOSPADM

## 2024-01-14 RX ORDER — OXYCODONE HYDROCHLORIDE 5 MG/1
5 TABLET ORAL EVERY 4 HOURS PRN
Status: DISCONTINUED | OUTPATIENT
Start: 2024-01-14 | End: 2024-01-18 | Stop reason: HOSPADM

## 2024-01-14 RX ORDER — FAMOTIDINE 10 MG/ML
20 INJECTION, SOLUTION INTRAVENOUS ONCE AS NEEDED
Status: COMPLETED | OUTPATIENT
Start: 2024-01-14 | End: 2024-01-14

## 2024-01-14 RX ORDER — SODIUM CHLORIDE, SODIUM LACTATE, POTASSIUM CHLORIDE, CALCIUM CHLORIDE 600; 310; 30; 20 MG/100ML; MG/100ML; MG/100ML; MG/100ML
125 INJECTION, SOLUTION INTRAVENOUS CONTINUOUS
Status: DISCONTINUED | OUTPATIENT
Start: 2024-01-14 | End: 2024-01-14

## 2024-01-14 RX ORDER — OXYTOCIN/0.9 % SODIUM CHLORIDE 30/500 ML
999 PLASTIC BAG, INJECTION (ML) INTRAVENOUS ONCE
Status: COMPLETED | OUTPATIENT
Start: 2024-01-14 | End: 2024-01-14

## 2024-01-14 RX ORDER — KETOROLAC TROMETHAMINE 30 MG/ML
30 INJECTION, SOLUTION INTRAMUSCULAR; INTRAVENOUS ONCE
Status: COMPLETED | OUTPATIENT
Start: 2024-01-14 | End: 2024-01-14

## 2024-01-14 RX ORDER — CARBOPROST TROMETHAMINE 250 UG/ML
250 INJECTION, SOLUTION INTRAMUSCULAR AS NEEDED
Status: DISCONTINUED | OUTPATIENT
Start: 2024-01-14 | End: 2024-01-14 | Stop reason: HOSPADM

## 2024-01-14 RX ORDER — DIPHENHYDRAMINE HCL 25 MG
25 CAPSULE ORAL EVERY 4 HOURS PRN
Status: DISCONTINUED | OUTPATIENT
Start: 2024-01-14 | End: 2024-01-18 | Stop reason: HOSPADM

## 2024-01-14 RX ORDER — SIMETHICONE 80 MG
80 TABLET,CHEWABLE ORAL
Status: DISCONTINUED | OUTPATIENT
Start: 2024-01-14 | End: 2024-01-18 | Stop reason: HOSPADM

## 2024-01-14 RX ORDER — IBUPROFEN 600 MG/1
600 TABLET ORAL EVERY 6 HOURS
Status: DISCONTINUED | OUTPATIENT
Start: 2024-01-15 | End: 2024-01-18 | Stop reason: HOSPADM

## 2024-01-14 RX ORDER — BUPIVACAINE HYDROCHLORIDE 7.5 MG/ML
INJECTION, SOLUTION EPIDURAL; RETROBULBAR
Status: COMPLETED | OUTPATIENT
Start: 2024-01-14 | End: 2024-01-14

## 2024-01-14 RX ADMIN — Medication 125 ML/HR: at 09:56

## 2024-01-14 RX ADMIN — DOCUSATE SODIUM 100 MG: 100 CAPSULE, LIQUID FILLED ORAL at 20:48

## 2024-01-14 RX ADMIN — SODIUM CHLORIDE, POTASSIUM CHLORIDE, SODIUM LACTATE AND CALCIUM CHLORIDE 125 ML/HR: 600; 310; 30; 20 INJECTION, SOLUTION INTRAVENOUS at 06:55

## 2024-01-14 RX ADMIN — DOCUSATE SODIUM 100 MG: 100 CAPSULE, LIQUID FILLED ORAL at 14:09

## 2024-01-14 RX ADMIN — KETOROLAC TROMETHAMINE 15 MG: 15 INJECTION, SOLUTION INTRAMUSCULAR; INTRAVENOUS at 16:57

## 2024-01-14 RX ADMIN — SODIUM CHLORIDE, POTASSIUM CHLORIDE, SODIUM LACTATE AND CALCIUM CHLORIDE 125 ML/HR: 600; 310; 30; 20 INJECTION, SOLUTION INTRAVENOUS at 06:56

## 2024-01-14 RX ADMIN — ONDANSETRON HYDROCHLORIDE 4 MG: 2 INJECTION, SOLUTION INTRAMUSCULAR; INTRAVENOUS at 07:33

## 2024-01-14 RX ADMIN — ACETAMINOPHEN 1000 MG: 500 TABLET ORAL at 20:48

## 2024-01-14 RX ADMIN — PHENYLEPHRINE HYDROCHLORIDE 200 MCG: 10 INJECTION INTRAVENOUS at 08:30

## 2024-01-14 RX ADMIN — BUPIVACAINE HYDROCHLORIDE 1.4 ML: 7.5 INJECTION, SOLUTION EPIDURAL; RETROBULBAR at 08:05

## 2024-01-14 RX ADMIN — ACETAMINOPHEN 1000 MG: 500 TABLET ORAL at 07:12

## 2024-01-14 RX ADMIN — ACETAMINOPHEN 1000 MG: 500 TABLET ORAL at 14:09

## 2024-01-14 RX ADMIN — CLONIDINE HYDROCHLORIDE 75 ML: 0.1 INJECTION, SOLUTION EPIDURAL at 08:34

## 2024-01-14 RX ADMIN — KETOROLAC TROMETHAMINE 30 MG: 30 INJECTION INTRAMUSCULAR; INTRAVENOUS at 09:58

## 2024-01-14 RX ADMIN — FENTANYL CITRATE 20 MCG: 50 INJECTION, SOLUTION INTRAMUSCULAR; INTRAVENOUS at 08:05

## 2024-01-14 RX ADMIN — Medication 999 ML/HR: at 08:20

## 2024-01-14 RX ADMIN — KETOROLAC TROMETHAMINE 15 MG: 15 INJECTION, SOLUTION INTRAMUSCULAR; INTRAVENOUS at 22:32

## 2024-01-14 RX ADMIN — PHENYLEPHRINE HYDROCHLORIDE 200 MCG: 10 INJECTION INTRAVENOUS at 08:48

## 2024-01-14 RX ADMIN — SIMETHICONE 80 MG: 80 TABLET, CHEWABLE ORAL at 16:58

## 2024-01-14 RX ADMIN — CEFAZOLIN SODIUM 2 G: 2 INJECTION, SOLUTION INTRAVENOUS at 07:37

## 2024-01-14 RX ADMIN — SIMETHICONE 80 MG: 80 TABLET, CHEWABLE ORAL at 22:32

## 2024-01-14 RX ADMIN — SODIUM CHLORIDE, POTASSIUM CHLORIDE, SODIUM LACTATE AND CALCIUM CHLORIDE 1000 ML: 600; 310; 30; 20 INJECTION, SOLUTION INTRAVENOUS at 06:15

## 2024-01-14 RX ADMIN — MORPHINE SULFATE 150 MCG: 4 INJECTION, SOLUTION INTRAMUSCULAR; INTRAVENOUS at 08:05

## 2024-01-14 RX ADMIN — SIMETHICONE 80 MG: 80 TABLET, CHEWABLE ORAL at 14:08

## 2024-01-14 RX ADMIN — FAMOTIDINE 20 MG: 10 INJECTION INTRAVENOUS at 07:35

## 2024-01-14 NOTE — H&P
[facility]   Obstetrics and Gynecology   History & Physical    2024    Patient: Ashley Ryan          MR#:[id number]    Chief complaint:  labor, history of fourth degree laceration    Subjective     33 y.o. female  at 38w2d with history of fourth degree laceration presents in labor.  After discussion with her primary OB, she has opted for primary .  Denies loss of fluid and vaginal bleeding.  Endorses regular fetal movements.  This pregnancy has been otherwise uncomplicated.  She had GBS bacteruria.      Patient Active Problem List   Diagnosis    Subclinical hypothyroidism    Infertility, female    History of maternal fourth degree perineal laceration, currently pregnant    Pregnancy, unspecified gestational age    GBS (group B streptococcus) UTI complicating pregnancy    Maternal anemia in pregnancy, antepartum       Past Medical History:   Diagnosis Date    Abnormal uterine bleeding (AUB) 2018    Anemia due to acute blood loss 2021    Disease of thyroid gland     Fourth degree perineal laceration 2021    Iron deficiency anemia secondary to inadequate dietary iron intake 3/19/2021    Mastitis associated with childbirth, delivered 2021       History reviewed. No pertinent surgical history.    Obstetric History:  OB History          2    Para   1    Term   1       0    AB   0    Living   1         SAB   0    IAB   0    Ectopic   0    Molar   0    Multiple   0    Live Births   1               Menstrual History:     Patient's last menstrual period was 2023.       # 1 - Date: 21, Sex: Male, Weight: 3033 g (6 lb 11 oz), GA: 38w4d, Delivery: Vaginal, Spontaneous, Apgar1: 8, Apgar5: 9, Living: Living, Birth Comments: scale 2    # 2 - Date: None, Sex: None, Weight: None, GA: None, Delivery: None, Apgar1: None, Apgar5: None, Living: None, Birth Comments: None      Prenatal Information:  Prenatal Results       Initial Prenatal Labs       Test  Value Reference Range Date Time    Hemoglobin  11.5 g/dL 12.0 - 15.9 08/21/23 1352       12.2 g/dL 11.1 - 15.9 06/16/23 1544    Hematocrit  35.1 % 34.0 - 46.6 08/21/23 1352       38.6 % 34.0 - 46.6 06/16/23 1544    Platelets  214 10*3/mm3 140 - 450 08/21/23 1352       210 x10E3/uL 150 - 450 06/16/23 1544    Rubella IgG  6.47 index Immune >0.99 06/16/23 1544    Hepatitis B SAg  Negative  Negative 06/16/23 1544    Hepatitis C Ab  Non Reactive  Non Reactive 06/16/23 1544    RPR  Non Reactive  Non Reactive 06/16/23 1544    T. Pallidum Ab         ABO  B   01/14/24 0626    Rh  Positive   01/14/24 0626    Antibody Screen  Negative  Negative 06/16/23 1544    HIV  Non Reactive  Non Reactive 06/16/23 1544    Urine Culture  Final report   10/09/23 1507       Final report   06/16/23 1610    Gonorrhea  Negative  Negative 06/16/23 1607    Chlamydia  Negative  Negative 06/16/23 1607    TSH  2.400 uIU/mL 0.270 - 4.200 12/21/23 1220       1.910 uIU/mL 0.450 - 4.500 11/07/23 1148       2.830 uIU/mL 0.270 - 4.200 10/09/23 1408       2.780 uIU/mL 0.270 - 4.200 08/21/23 1356       1.540 uIU/mL 0.450 - 4.500 06/16/23 1544    HgB A1c         Varicella IgG ^ Pos H/O   06/16/23     HgB Electrophoresis         Cystic fibrosis                   Fetal testing        Test Value Reference Range Date Time    NIPT        MSAFP  *Screen Negative*   08/21/23 1355    AFP-4                  2nd and 3rd Trimester       Test Value Reference Range Date Time    Hemoglobin (repeated)  12.7 g/dL 12.0 - 15.9 01/14/24 0626       12.5 g/dL 12.0 - 15.9 12/21/23 1220       11.9 g/dL 11.1 - 15.9 11/07/23 1148       11.2 g/dL 12.0 - 15.9 10/09/23 1408    Hematocrit (repeated)  39.0 % 34.0 - 46.6 01/14/24 0626       37.6 % 34.0 - 46.6 12/21/23 1220       35.6 % 34.0 - 46.6 11/07/23 1148       34.0 % 34.0 - 46.6 10/09/23 1408    Platelets   155 10*3/mm3 140 - 450 01/14/24 0626       168 10*3/mm3 140 - 450 12/21/23 1220       197 x10E3/uL 150 - 450 11/07/23 1148        212 10*3/mm3 140 - 450 10/09/23 1408       214 10*3/mm3 140 - 450 08/21/23 1352       210 x10E3/uL 150 - 450 06/16/23 1544    GCT  114 mg/dL 70 - 139 11/07/23 1148    Antibody Screen (repeated)  Negative   01/14/24 0626    Third Trimester syphilis screen (repeated)         GTT Fasting        GTT 1 Hr        GTT 2 Hr        GTT 3 Hr        Group B Strep                  Other testing        Test Value Reference Range Date Time    Parvo IgG         CMV IgG                   Drug Screening       Test Value Reference Range Date Time    Amphetamine Screen        Barbiturate Screen        Benzodiazepine Screen        Methadone Screen        Phencyclidine Screen        Opiates Screen        THC Screen        Cocaine Screen        Propoxyphene Screen        Buprenorphine Screen        Methamphetamine Screen        Oxycodone Screen        Tricyclic Antidepressants Screen                  Legend    ^: Historical                          External Prenatal Results       Pregnancy Outside Results - Transcribed From Office Records - See Scanned Records For Details       Test Value Date Time    ABO  B  01/14/24 0626    Rh  Positive  01/14/24 0626    Antibody Screen  Negative  01/14/24 0626       Negative  06/16/23 1544    Varicella IgG ^ Pos H/O  06/16/23     Rubella  6.47 index 06/16/23 1544    Hgb  12.7 g/dL 01/14/24 0626       12.5 g/dL 12/21/23 1220       11.9 g/dL 11/07/23 1148       11.2 g/dL 10/09/23 1408       11.5 g/dL 08/21/23 1352       12.2 g/dL 06/16/23 1544    Hct  39.0 % 01/14/24 0626       37.6 % 12/21/23 1220       35.6 % 11/07/23 1148       34.0 % 10/09/23 1408       35.1 % 08/21/23 1352       38.6 % 06/16/23 1544    Glucose Fasting GTT       Glucose Tolerance Test 1 hour       Glucose Tolerance Test 3 hour       Gonorrhea (discrete)  Negative  06/16/23 1607    Chlamydia (discrete)  Negative  06/16/23 1607    RPR  Non Reactive  06/16/23 1544    VDRL       Syphilis Antibody       HBsAg  Negative  06/16/23 1544     Herpes Simplex Virus PCR       Herpes Simplex VIrus Culture       HIV  Non Reactive  06/16/23 1544    Hep C RNA Quant PCR       Hep C Antibody  Non Reactive  06/16/23 1544    AFP  48.9 ng/mL 08/21/23 1355    Group B Strep  Negative  04/29/21 1554    GBS Susceptibility to Clindamycin       GBS Susceptibility to Erythromycin       Fetal Fibronectin       Genetic Testing, Maternal Blood                 Drug Screening       Test Value Date Time    Urine Drug Screen       Amphetamine Screen       Barbiturate Screen       Benzodiazepine Screen       Methadone Screen       Phencyclidine Screen       Opiates Screen       THC Screen       Cocaine Screen       Propoxyphene Screen       Buprenorphine Screen       Methamphetamine Screen       Oxycodone Screen       Tricyclic Antidepressants Screen                 Legend    ^: Historical                              Family History   Problem Relation Age of Onset    Heart disease Maternal Grandmother     No Known Problems Mother     No Known Problems Father        Social History     Tobacco Use    Smoking status: Never    Smokeless tobacco: Never   Vaping Use    Vaping Use: Never used   Substance Use Topics    Alcohol use: No    Drug use: No       Penicillins      Current Facility-Administered Medications:     carboprost (HEMABATE) injection 250 mcg, 250 mcg, Intramuscular, PRN, Guille Thomas MD    ceFAZolin in dextrose (ANCEF) IVPB solution 2 g, 2 g, Intravenous, Once, Guille Thomas MD, 2 g at 01/14/24 0737    erythromycin (ROMYCIN) 5 MG/GM ophthalmic ointment  - ADS Override Pull, , , ,     incisional cocktail 6 - Ropivacaine 0.5% (50mL), Clonidine HCl 80mcg/0.8 mL,  Epinephrine 1:1000 (0.3mL), N/S 0.9% (50mL) solution 100 mL, 100 mL, Injection, Once, Jessenia Singh MD    ketorolac (TORADOL) injection 30 mg, 30 mg, Intravenous, Once, Guille Thomas MD    lactated ringers infusion, 125 mL/hr, Intravenous, Continuous, Guille Thomas MD, Last Rate: 125 mL/hr at 01/14/24 0656, 125  mL/hr at 01/14/24 0656    lidocaine (XYLOCAINE) 1 % injection 0.5 mL, 0.5 mL, Intradermal, Once PRN, Guille Thomas MD    methylergonovine (METHERGINE) injection 200 mcg, 200 mcg, Intramuscular, PRN, Guille Thomas MD    miSOPROStol (CYTOTEC) tablet 800 mcg, 800 mcg, Rectal, PRN, Guille Thomas MD    ondansetron (ZOFRAN) tablet 4 mg, 4 mg, Oral, Q6H PRN **OR** ondansetron (ZOFRAN) injection 4 mg, 4 mg, Intravenous, Q6H PRN, Guille Thomas MD, 4 mg at 01/14/24 0733    ondansetron (ZOFRAN) injection 4 mg, 4 mg, Intravenous, Once PRN, Kalia Cervantes MD    oxytocin (PITOCIN) 30 units in 0.9% sodium chloride 500 mL (premix), 999 mL/hr, Intravenous, Once **FOLLOWED BY** oxytocin (PITOCIN) 30 units in 0.9% sodium chloride 500 mL (premix), 250 mL/hr, Intravenous, Continuous, Guille Thomas MD    phytonadione (VITAMIN K) 1 MG/0.5ML injection  - ADS Override Pull, , , ,     sodium chloride 0.9 % flush 10 mL, 10 mL, Intravenous, Q12H, Guille Thomas MD    sodium chloride 0.9 % flush 10 mL, 10 mL, Intravenous, PRN, Guille Thomas MD    sodium chloride 0.9 % infusion 40 mL, 40 mL, Intravenous, PRN, Guille Thomas MD    Review of Systems  Review of Systems   All other systems reviewed and are negative.      Objective     Vital Signs  Temp:  [98.2 °F (36.8 °C)] 98.2 °F (36.8 °C)  Heart Rate:  [] 103  Resp:  [16] 16  BP: (115-119)/(70-86) 118/86    Physical Exam:  Physical Exam  Vitals and nursing note reviewed.   Constitutional:       General: She is not in acute distress.     Appearance: Normal appearance.   HENT:      Head: Normocephalic and atraumatic.   Eyes:      Extraocular Movements: Extraocular movements intact.   Cardiovascular:      Rate and Rhythm: Normal rate.   Pulmonary:      Effort: Pulmonary effort is normal. No respiratory distress.   Abdominal:      General: There is no distension.      Palpations: Abdomen is soft. There is no mass.      Tenderness: There is no abdominal tenderness.   Genitourinary:     Comments: Cervix  /-2, soft per RN  Musculoskeletal:         General: Normal range of motion.      Cervical back: Normal range of motion.   Skin:     General: Skin is warm and dry.   Neurological:      General: No focal deficit present.      Mental Status: She is alert and oriented to person, place, and time.   Psychiatric:         Mood and Affect: Mood normal.         Behavior: Behavior normal.           Hospital problem list:    History of maternal fourth degree perineal laceration, currently pregnant    GBS (group B streptococcus) UTI complicating pregnancy    Maternal anemia in pregnancy, antepartum      Assessment & Plan     1. Intrauterine pregnancy at 38w2d with history of fourth degree laceration presents in labor  -Patient desires primary   -Reviewed procedure with patient.  I discussed the risks including but not limited to bleeding, infection and damage to internal organs.  Understanding of the procedure is voiced.     Dispo: admit for c/s    Jessenia Génesis Singh MD  24  07:49 EST      Patient Care Team:  Pushpa Barrientos MD as PCP - General (Family Medicine)  Guille Thomas MD as Consulting Physician (Obstetrics and Gynecology)  Gillian Manrique APRN as Nurse Practitioner (Obstetrics and Gynecology)

## 2024-01-14 NOTE — ANESTHESIA PROCEDURE NOTES
Spinal Block      Patient reassessed immediately prior to procedure    Patient location during procedure: OR  Start Time: 1/14/2024 8:05 AM  Indication:at surgeon's request  Performed By  Anesthesiologist: Maged Garcia MD  Preanesthetic Checklist  Completed: patient identified, IV checked, site marked, risks and benefits discussed, surgical consent, monitors and equipment checked, pre-op evaluation and timeout performed  Spinal Block Prep:  Sterile Tech:cap, gloves and sterile barriers  Patient Monitoring:EKG, continuous pulse oximetry and blood pressure monitoring    Spinal Block Procedure  Approach:midline  Guidance:landmark technique  Location:L3-L4  Needle Type:Pencan  Needle Gauge:24 G  Placement of Spinal needle event:cerebrospinal fluid aspirated    Fluid Appearance:clear  Medications: fentaNYL citrate (PF) (SUBLIMAZE) injection - Intrathecal   20 mcg - 1/14/2024 8:05:00 AM  Morphine sulfate (PF) injection - Spinal   150 mcg - 1/14/2024 8:05:00 AM  bupivacaine PF (MARCAINE) 0.75 % injection - Spinal   1.4 mL - 1/14/2024 8:05:00 AM   Post Assessment  Patient Tolerance:patient tolerated the procedure well with no apparent complications  Complications no

## 2024-01-14 NOTE — ANESTHESIA POSTPROCEDURE EVALUATION
"Patient: Ashley Ryan    Procedure Summary       Date: 24 Room / Location:  [facility]    Anesthesia Start: 757 Anesthesia Stop: 902    Procedure:  SECTION PRIMARY (Abdomen) Diagnosis:       History of maternal fourth degree perineal laceration, currently pregnant      (History of maternal fourth degree perineal laceration, currently pregnant [O09.299])    Surgeons: Jessenia Singh MD Provider: Maged Garcia MD    Anesthesia Type: spinal ASA Status: 2            Anesthesia Type: spinal    Vitals  Vitals Value Taken Time   /61 24 1057   Temp 36.8 °C (98.2 °F) 24 1057   Pulse 80 24 1057   Resp 16 24 1057   SpO2 100 % 24 1057           Post Anesthesia Care and Evaluation    Patient location during evaluation: bedside  Patient participation: complete - patient participated  Level of consciousness: awake and alert  Pain management: adequate    Airway patency: patent  Anesthetic complications: No anesthetic complications    Cardiovascular status: acceptable  Respiratory status: acceptable  Hydration status: acceptable    Comments: /61   Pulse 80   Temp 36.8 °C (98.2 °F)   Resp 16   Ht 152.4 cm (60\")   Wt 55.8 kg (123 lb)   LMP 2023   SpO2 100%   Breastfeeding Yes   BMI 24.02 kg/m²     "

## 2024-01-14 NOTE — ANESTHESIA PREPROCEDURE EVALUATION
Anesthesia Evaluation     Patient summary reviewed and Nursing notes reviewed   NPO Solid Status: > 8 hours  NPO Liquid Status: > 2 hours           Airway   Mallampati: II  TM distance: >3 FB  Neck ROM: full  no difficulty expected  Dental - normal exam     Pulmonary - negative pulmonary ROS   (-) decreased breath sounds, wheezes  Cardiovascular - normal exam  Exercise tolerance: good (4-7 METS)    (-) hypertension      Neuro/Psych- negative ROS  (-) seizures, CVA  GI/Hepatic/Renal/Endo - negative ROS   (-) diabetes    Musculoskeletal (-) negative ROS    Abdominal    Substance History - negative use  (-) alcohol use, drug use     OB/GYN negative ob/gyn ROS         Other - negative ROS                         Anesthesia Plan    ASA 2     spinal       Anesthetic plan, risks, benefits, and alternatives have been provided, discussed and informed consent has been obtained with: patient.        CODE STATUS:    Level Of Support Discussed With: Patient  Code Status (Patient has no pulse and is not breathing): CPR (Attempt to Resuscitate)  Medical Interventions (Patient has pulse or is breathing): Full Support

## 2024-01-14 NOTE — LACTATION NOTE
This note was copied from a baby's chart.  P2 term baby, 4hrs old via C/S. Baby is nursing now with deep latch and he breast fed after delivery also. Mom reports having to supplement her first baby with formula until her milk supply increased and this is her plan with current baby. She has personal breast pump. Encouraged to call for any assistance.

## 2024-01-14 NOTE — L&D DELIVERY NOTE
[facility]   Obstetrics and Gynecology     2024    Patient:Ashley Ryan   MR#:[id number]     Section Procedure Note    Indications:  History of fourth degree laceration, presents in labor    Pre-operative Diagnosis: Intrauterine pregnancy at 38w2d    Post-operative Diagnosis: same    Procedure:  Low transverse  section     Surgeon: Jessenia Singh MD     Assistants: Mango Cox    Anesthesia: Spinal anesthesia    Prenatal care problem list:  Patient Active Problem List   Diagnosis    Subclinical hypothyroidism    Infertility, female    History of maternal fourth degree perineal laceration, currently pregnant    Pregnancy, unspecified gestational age    GBS (group B streptococcus) UTI complicating pregnancy    Maternal anemia in pregnancy, antepartum     delivery delivered       Procedure Details   The patient was seen in the LDR preoperatively. The risks, benefits, complications, treatment options, and expected outcomes were discussed with the patient.  The patient concurred with the proposed plan, giving informed consent.  The site of surgery is discussed. The patient was taken to Operating Room # 1, identified as Ashley Ryan and the procedure verified as  Delivery. A Time Out was held and the above information confirmed.    After induction of anesthesia, the patient was draped and prepped in the usual sterile manner. A Pfannenstiel incision was made and carried down through the subcutaneous tissue to the fascia. Fascial incision was made and extended transversely. The fascia was  from the underlying rectus tissue superiorly and inferiorly. The peritoneum was identified and entered. Peritoneal incision was extended longitudinally.  A low transverse uterine incision was made.  Delivered from vertex presentation was a male  fetus 3070 g (6 lb 12.3 oz)  with Apgar scores of 9 at one minute and 9 at five minutes. Umbilical cord was clamped and cut  after a 30-second delay.  Cord blood was obtained for evaluation. The placenta was removed intact and appeared normal. The uterine outline, tubes and ovaries appeared normal.  The uterine incision was closed with running locked sutures of 0 monocryl.  Excellent hemostasis was observed.  The posterior cul-de-sac was cleared of all blood.  The uterus was returned to the abdomen.  The paracolic gutters were cleared of all blood.  The uterus was reexamined and excellent hemostasis was confirmed.    The fascia was then reapproximated with running sutures of 0 Vicryl. The deep subcutaneous layer was reapproximated with 3-0 monocryl in a running fashion. The skin was reapproximated with 4-0 monocryl in a subcuticular fashion. OB Anesthetic Cocktail was injected into subcutaneous and dermal layers.    Instrument, sponge, and needle counts were correct prior to abdominal closure and at the conclusion of the case.     Surgical assistant was responsible for performing the following activities: Retraction, Suction, Irrigation, Closing, Placing Dressing and Delivery of Fetus and their skilled assistance was necessary for the success of this case.    Findings:  YOB: 2025   Time of birth: 8:18 AM   Live, viable male infant  Baby weight: 3070 g (6 lb 12.3 oz)             APGARS  One minute Five minutes   Skin color: 1   1     Heart rate: 2   2     Grimace: 2   2     Muscle tone: 2   2     Breathin   2     Totals: 9   9       Normal gravid uterus  Normal bilateral fallopian tubes and ovaries    Estimated Blood Loss:  300 mL    Calculated Blood Loss:              Specimens:  Placenta            Complications:  None; patient tolerated the procedure well.           Disposition: PACU - hemodynamically stable.           Condition: stable      Jessenia Génesis Singh MD  2024   08:55 EST

## 2024-01-14 NOTE — OBED NOTES
[facility] Note OB    Patient Name: Ashley Ryan  YOB: 1990  MRN: [id number]  Admission Date: 2024  5:36 AM  Date of Service: 2024    Chief Complaint: Contractions (Pt presents to [facility] for c/o ctx that began last night but have worsened, 9/10. +FM. Denies LOF or VB. Pt has CS scheduled on  due to previous 4th degree tear. /)        Subjective     Ashley Ryan is a 33 y.o. female  at 38w2d with Estimated Date of Delivery: 24 who presents with the chief complaint listed above. Last ate at 4 am, egg and toast.     She sees Guille Thomas MD for her prenatal care. Her pregnancy has been complicated by:  UTI, hypothyroid, anemia, hx of 4 degree with 6 lb baby so planning on primary CS, GBBS pos    She describes fetal movement as normal.  She denies rupture of membranes.  She denies vaginal bleeding. She is feeling contractions.        Objective   Patient Active Problem List    Diagnosis     *History of maternal fourth degree perineal laceration, currently pregnant [O09.299]     History of fourth degree perineal laceration [Z87.59]     Maternal anemia in pregnancy, antepartum [O99.019]     GBS (group B streptococcus) UTI complicating pregnancy [O23.40, B95.1]     Pregnancy, unspecified gestational age [Z34.90]     Infertility, female [N97.9]     Subclinical hypothyroidism [E03.8]         OB History    Para Term  AB Living   2 1 1 0 0 1   SAB IAB Ectopic Molar Multiple Live Births   0 0 0 0 0 1      # Outcome Date GA Lbr Yair/2nd Weight Sex Delivery Anes PTL Lv   2 Current            1 Term 21 38w4d 02:41 / 01:22 3033 g (6 lb 11 oz) M Vag-Spont EPI N ALIRIO      Birth Comments: scale 2      Name: MUNA RYAN      Apgar1: 8  Apgar5: 9        Past Medical History:   Diagnosis Date    Abnormal uterine bleeding (AUB) 2018    Anemia due to acute blood loss 2021    Disease of thyroid gland     Fourth degree perineal laceration 2021    Iron deficiency anemia  "secondary to inadequate dietary iron intake 3/19/2021    Mastitis associated with childbirth, delivered 2021       History reviewed. No pertinent surgical history.    No current facility-administered medications on file prior to encounter.     Current Outpatient Medications on File Prior to Encounter   Medication Sig Dispense Refill    calcium carbonate (OS-BLANCHE) 600 MG tablet Take 1 tablet by mouth Daily.      calcium carbonate (TUMS) 500 MG chewable tablet Chew 1 tablet Daily.      ferrous sulfate 325 (65 FE) MG tablet Take 1 tablet by mouth Daily. 30 tablet 4    Prenatal Multivit-Min-Fe-FA (PRE- PO) Take  by mouth.         Allergies   Allergen Reactions    Penicillins Rash       Family History   Problem Relation Age of Onset    Heart disease Maternal Grandmother     No Known Problems Mother     No Known Problems Father        Social History     Socioeconomic History    Marital status:      Spouse name: Kinga   Tobacco Use    Smoking status: Never    Smokeless tobacco: Never   Vaping Use    Vaping Use: Never used   Substance and Sexual Activity    Alcohol use: No    Drug use: No    Sexual activity: Yes     Partners: Male           Review of Systems   Constitutional:  Negative for chills and fever.   HENT: Negative.     Eyes:  Negative for photophobia and visual disturbance.   Respiratory:  Negative for shortness of breath.    Cardiovascular:  Negative for chest pain.   Gastrointestinal:  Positive for abdominal pain. Negative for nausea.   Psychiatric/Behavioral:  The patient is not nervous/anxious.           PHYSICAL EXAM:      VITAL SIGNS:  Vitals:    24 0549 24 0550 24 0555 24 0600   BP: 119/70   115/72   Pulse: 92 95 92 97   Resp: 16      Temp: 98.2 °F (36.8 °C)      TempSrc: Oral      SpO2:  99% 98% 98%   Weight:       Height: 152.4 cm (60\")               FHT'S:   140 with moderate variability and accels                                     PHYSICAL EXAM:      General: " well developed; well nourished  no acute distress   Heart: Not performed.   Lungs   breathing is unlabored   Abdomen: Gravid and non tender     Extremities: trace edema, DTRs 1 plus, no clonus       Cervix: Per RN  Cervical Dilation (cm): 4  Cervical Effacement: 90%  Fetal Station: -2  Cervical Consistency: soft  Cervical Position: posterior     Contractions:   regular                    LABS AND TESTING ORDERED:  Uterine and fetal monitoring  Urinalysis  Admit labs    LAB RESULTS:    No results found for this or any previous visit (from the past 24 hour(s)).    Lab Results   Component Value Date    ABO B 06/16/2023    RH Positive 06/16/2023       Lab Results   Component Value Date    STREPGPB Negative 04/29/2021                 External Prenatal Results       Pregnancy Outside Results - Transcribed From Office Records - See Scanned Records For Details       Test Value Date Time    ABO  B  06/16/23 1544    Rh  Positive  06/16/23 1544    Antibody Screen  Negative  06/16/23 1544    Varicella IgG ^ Pos H/O  06/16/23     Rubella  6.47 index 06/16/23 1544    Hgb  12.5 g/dL 12/21/23 1220       11.9 g/dL 11/07/23 1148       11.2 g/dL 10/09/23 1408       11.5 g/dL 08/21/23 1352       12.2 g/dL 06/16/23 1544    Hct  37.6 % 12/21/23 1220       35.6 % 11/07/23 1148       34.0 % 10/09/23 1408       35.1 % 08/21/23 1352       38.6 % 06/16/23 1544    Glucose Fasting GTT       Glucose Tolerance Test 1 hour       Glucose Tolerance Test 3 hour       Gonorrhea (discrete)  Negative  06/16/23 1607    Chlamydia (discrete)  Negative  06/16/23 1607    RPR  Non Reactive  06/16/23 1544    VDRL       Syphilis Antibody       HBsAg  Negative  06/16/23 1544    Herpes Simplex Virus PCR       Herpes Simplex VIrus Culture       HIV  Non Reactive  06/16/23 1544    Hep C RNA Quant PCR       Hep C Antibody  Non Reactive  06/16/23 1544    AFP  48.9 ng/mL 08/21/23 1355    Group B Strep  Negative  04/29/21 1554    GBS Susceptibility to Clindamycin        GBS Susceptibility to Erythromycin       Fetal Fibronectin       Genetic Testing, Maternal Blood                 Drug Screening       Test Value Date Time    Urine Drug Screen       Amphetamine Screen       Barbiturate Screen       Benzodiazepine Screen       Methadone Screen       Phencyclidine Screen       Opiates Screen       THC Screen       Cocaine Screen       Propoxyphene Screen       Buprenorphine Screen       Methamphetamine Screen       Oxycodone Screen       Tricyclic Antidepressants Screen                 Legend    ^: Historical                              Impression:   @ 38w2d .  Final Diagnosis: early active labor, scheduled for primary CS due to x of 4 degree tear     Plan:  1. Left message with Dr Singh who will admit.        Alivia Chiu MD  2024  06:19 EST

## 2024-01-14 NOTE — PLAN OF CARE
Goal Outcome Evaluation:  Plan of Care Reviewed With: patient, spouse        Progress: improving  Outcome Evaluation: s/p c/s, pain and bleeding controlled. Assisting with BF and bonding PRN. Pt reassured of normal post operative sx such as shaking and itching.

## 2024-01-15 LAB
BASOPHILS # BLD AUTO: 0.04 10*3/MM3 (ref 0–0.2)
BASOPHILS NFR BLD AUTO: 0.3 % (ref 0–1.5)
DEPRECATED RDW RBC AUTO: 43.1 FL (ref 37–54)
EOSINOPHIL # BLD AUTO: 0.1 10*3/MM3 (ref 0–0.4)
EOSINOPHIL NFR BLD AUTO: 0.9 % (ref 0.3–6.2)
ERYTHROCYTE [DISTWIDTH] IN BLOOD BY AUTOMATED COUNT: 12.8 % (ref 12.3–15.4)
HCT VFR BLD AUTO: 32.5 % (ref 34–46.6)
HGB BLD-MCNC: 11 G/DL (ref 12–15.9)
IMM GRANULOCYTES # BLD AUTO: 0.09 10*3/MM3 (ref 0–0.05)
IMM GRANULOCYTES NFR BLD AUTO: 0.8 % (ref 0–0.5)
LYMPHOCYTES # BLD AUTO: 1.84 10*3/MM3 (ref 0.7–3.1)
LYMPHOCYTES NFR BLD AUTO: 15.6 % (ref 19.6–45.3)
MCH RBC QN AUTO: 31.5 PG (ref 26.6–33)
MCHC RBC AUTO-ENTMCNC: 33.8 G/DL (ref 31.5–35.7)
MCV RBC AUTO: 93.1 FL (ref 79–97)
MONOCYTES # BLD AUTO: 0.81 10*3/MM3 (ref 0.1–0.9)
MONOCYTES NFR BLD AUTO: 6.9 % (ref 5–12)
NEUTROPHILS NFR BLD AUTO: 75.5 % (ref 42.7–76)
NEUTROPHILS NFR BLD AUTO: 8.88 10*3/MM3 (ref 1.7–7)
NRBC BLD AUTO-RTO: 0 /100 WBC (ref 0–0.2)
PLATELET # BLD AUTO: 141 10*3/MM3 (ref 140–450)
PMV BLD AUTO: 10.6 FL (ref 6–12)
RBC # BLD AUTO: 3.49 10*6/MM3 (ref 3.77–5.28)
WBC NRBC COR # BLD AUTO: 11.76 10*3/MM3 (ref 3.4–10.8)

## 2024-01-15 PROCEDURE — 25010000002 KETOROLAC TROMETHAMINE PER 15 MG: Performed by: OBSTETRICS & GYNECOLOGY

## 2024-01-15 PROCEDURE — 85025 COMPLETE CBC W/AUTO DIFF WBC: CPT | Performed by: OBSTETRICS & GYNECOLOGY

## 2024-01-15 RX ADMIN — SIMETHICONE 80 MG: 80 TABLET, CHEWABLE ORAL at 19:09

## 2024-01-15 RX ADMIN — IBUPROFEN 600 MG: 600 TABLET, FILM COATED ORAL at 17:41

## 2024-01-15 RX ADMIN — ACETAMINOPHEN 650 MG: 325 TABLET, FILM COATED ORAL at 21:15

## 2024-01-15 RX ADMIN — KETOROLAC TROMETHAMINE 15 MG: 15 INJECTION, SOLUTION INTRAMUSCULAR; INTRAVENOUS at 04:38

## 2024-01-15 RX ADMIN — ACETAMINOPHEN 1000 MG: 500 TABLET ORAL at 09:09

## 2024-01-15 RX ADMIN — LEVOTHYROXINE SODIUM 25 MCG: 0.03 TABLET ORAL at 05:42

## 2024-01-15 RX ADMIN — SIMETHICONE 80 MG: 80 TABLET, CHEWABLE ORAL at 15:07

## 2024-01-15 RX ADMIN — ACETAMINOPHEN 650 MG: 325 TABLET, FILM COATED ORAL at 15:07

## 2024-01-15 RX ADMIN — ACETAMINOPHEN 1000 MG: 500 TABLET ORAL at 02:41

## 2024-01-15 RX ADMIN — DOCUSATE SODIUM 100 MG: 100 CAPSULE, LIQUID FILLED ORAL at 09:10

## 2024-01-15 RX ADMIN — OXYCODONE HYDROCHLORIDE 5 MG: 5 TABLET ORAL at 16:32

## 2024-01-15 RX ADMIN — KETOROLAC TROMETHAMINE 15 MG: 15 INJECTION, SOLUTION INTRAMUSCULAR; INTRAVENOUS at 11:44

## 2024-01-15 RX ADMIN — DOCUSATE SODIUM 100 MG: 100 CAPSULE, LIQUID FILLED ORAL at 19:08

## 2024-01-15 NOTE — LACTATION NOTE
Lactation Consult Note  PT called for help with BF. Reports baby has been very sleepy and also frenotomy was just done and mom wants to make sure the latch is better. Assisted mom with waking up baby and latching him on the right breast in a football position. Educated PT on starting nipple to nose to achieve deep latch and baby was able to do it after few attempts and some suck training. He is latching well and has a good jaw rotation, but is falling asleep. Educated mom on different ways to keep baby awake during BF.  Encouraged her to BF baby every 2-3 hours for 10-15 min on each breast and then supplement with formula.  Educated on the importance of stimulation for adequate milk supply. Educated also on hand expression and burping baby between breasts. Mother is able to express few drops of colostrum.  She denies any questions. Encouraged to call if needing further help.       Evaluation Completed: 1/15/2024 15:06 EST  Patient Name: Ashley Ryan  :  1990  MRN:  [id number]     REFERRAL  INFORMATION:                          Date of Referral: 01/15/24   Person Making Referral: patient  Maternal Reason for Referral: breastfeeding currently, latch difficulty  Infant Reason for Referral: sleepy, other (see comments) (frenotomy just done)    DELIVERY HISTORY:        Skin to skin initiation date/time: 2024  9:10 AM   Skin to skin end date/time:           MATERNAL ASSESSMENT:  Breast Size Issue: none (01/15/24 1456)  Breast Shape: Bilateral:, round, wide, lack of glandular tissue (01/15/24 1456)  Breast Density: Bilateral:, soft (01/15/24 1456)  Areola: Bilateral:, elastic (01/15/24 1456)  Nipples: Bilateral:, everted, graspable (01/15/24 1456)                INFANT ASSESSMENT:  Information for the patient's :  Ashley Ryan [[id number]]   No past medical history on file.   Feeding Readiness Cues: sleeping (01/15/24 1456)      Feeding Tolerance/Success: arousal required, sleepy (01/15/24 1456)                Feeding Interventions: arousal required, jaw supported, latch assistance provided, lips stroked, sucking promoted (01/15/24 1456)               Breastfeeding: breastfeeding, right side only (01/15/24 1456)   Infant Positioning: clutch/football (01/15/24 1456)         Effective Latch During Feeding: yes (01/15/24 1456)   Suck/Swallow/Breathing Coordination: present (01/15/24 1456)   Signs of Milk Transfer: deep jaw excursions noted (01/15/24 1456)       Latch: 2-->grasps breast, tongue down, lips flanged, rhythmic sucking (01/15/24 1456)   Audible Swallowin-->a few with stimulation (01/15/24 1456)   Type of Nipple: 2-->everted (after stimulation) (01/15/24 1456)   Comfort (Breast/Nipple): 2-->soft/nontender (01/15/24 1456)   Hold (Positioning): 1-->minimal assist, teach one side, mother does other, staff holds (01/15/24 1456)   Latch Score: 8 (01/15/24 1456)                    MATERNAL INFANT FEEDING:     Maternal Emotional State: receptive, relaxed (01/15/24 1456)  Infant Positioning: clutch/football (01/15/24 1456)   Signs of Milk Transfer: deep jaw excursions noted (01/15/24 1456)  Pain with Feeding: no (01/15/24 1456)           Milk Ejection Reflex: present (01/15/24 1456)           Latch Assistance: minimal assistance (01/15/24 1456)                               EQUIPMENT TYPE:                                 BREAST PUMPING:          LACTATION REFERRALS:

## 2024-01-15 NOTE — LACTATION NOTE
Pt reports she BF first and then gives baby some formula supplement. Pt reports she doesn't have milk. Educated pt on hand expression. Pt tried but was unable to express colostrum at this time from either breast. Encouraged to cont to latch baby first. Educated on supply and demand, maternal diet and hydration. Encouraged to call LC as needed. Baby in nursery at this time.    Lactation Consult Note    Evaluation Completed: 1/15/2024 09:54 EST  Patient Name: Ashley Ryan  :  1990  MRN:  [id number]     REFERRAL  INFORMATION:                                         DELIVERY HISTORY:        Skin to skin initiation date/time: 2024  9:10 AM   Skin to skin end date/time:           MATERNAL ASSESSMENT:                               INFANT ASSESSMENT:  Information for the patient's :  Ashley Ryan [[id number]]   No past medical history on file.                                                                                                   MATERNAL INFANT FEEDING:                                                                       EQUIPMENT TYPE:                                 BREAST PUMPING:          LACTATION REFERRALS:

## 2024-01-15 NOTE — PROGRESS NOTES
[facility]   Obstetrics and Gynecology     1/15/2024    Patient: Ashley Ryan   MR#:[id number]        Progress note         HD#1  Post-Op Day 1 S/P    Delivered a male infant.    Subjective     Ashley Ryan is a 33 y.o. female  post operative from CS at 38w2d weeks  Patient reports:  Pain is well controlled. Voiding and ambulating without difficulty.  Tolerating po. Lochia normal.     Breast/bottle The patient is currently breastfeeding.    Patient Active Problem List   Diagnosis    Subclinical hypothyroidism    Infertility, female    History of maternal fourth degree perineal laceration, currently pregnant    Pregnancy, unspecified gestational age    GBS (group B streptococcus) UTI complicating pregnancy    Maternal anemia in pregnancy, antepartum     delivery delivered        Objective      Vital Signs Range for the last 24 hours    Temperature: Temp:  [98 °F (36.7 °C)-99.3 °F (37.4 °C)] 98.1 °F (36.7 °C)  BP:  BP: ()/(59-64) 96/63  Pulse:  Heart Rate:  [76-92] 76  Respirations: Resp:  [16-20] 16  Weight: 55.8 kg (123 lb)   BMI:  Body mass index is 24.02 kg/m².    I/O last 3 completed shifts:  In:  [I.V.:]  Out: 3557 [Urine:2800; Blood:757]  No intake/output data recorded.     Physical Exam  Vitals and nursing note reviewed.   Constitutional:       General: She is not in acute distress.     Appearance: Normal appearance.   Cardiovascular:      Pulses: Normal pulses.   Pulmonary:      Effort: Pulmonary effort is normal.   Abdominal:      General: There is no distension.      Palpations: Abdomen is soft.      Tenderness: There is no abdominal tenderness. There is no guarding or rebound.      Comments: Incision c/d/i   Musculoskeletal:         General: No swelling or tenderness.      Right lower leg: No edema.      Left lower leg: No edema.   Neurological:      Mental Status: She is alert and oriented to person, place, and time.   Psychiatric:         Mood  and Affect: Mood normal.         Behavior: Behavior normal.         LABS:    Results from last 7 days   Lab Units 01/15/24  0605 24  0626   WBC 10*3/mm3 11.76* 11.13*   HEMOGLOBIN g/dL 11.0* 12.7   HEMATOCRIT % 32.5* 39.0   PLATELETS 10*3/mm3 141 155             Assessment & Plan     1.  POD #1 S/P C/S:  Hemodynamically stable.  Doing well.     History of maternal fourth degree perineal laceration, currently pregnant    GBS (group B streptococcus) UTI complicating pregnancy    Maternal anemia in pregnancy, antepartum     delivery delivered      Plan:    Continue routine postpartum care  Infant circumcision:  Procedure reviewed with the patient including risk, benefits and elective nature of the procedure - will complete tomorrow          Jessenia Singh MD  1/15/2024  09:53 EST

## 2024-01-15 NOTE — PLAN OF CARE
Goal Outcome Evaluation:  Plan of Care Reviewed With: patient           Outcome Evaluation: VS stable; Encouraged patient to ambulate more frequently today.  Has ambulated in room with assistance from family.  Using abdominal binder.  Increased pain this afternoon and oxycodone 5mg with good pain relief.  Tolerating regular diet.

## 2024-01-16 RX ADMIN — ACETAMINOPHEN 650 MG: 325 TABLET, FILM COATED ORAL at 21:58

## 2024-01-16 RX ADMIN — ACETAMINOPHEN 650 MG: 325 TABLET, FILM COATED ORAL at 15:13

## 2024-01-16 RX ADMIN — IBUPROFEN 600 MG: 600 TABLET, FILM COATED ORAL at 00:23

## 2024-01-16 RX ADMIN — SIMETHICONE 80 MG: 80 TABLET, CHEWABLE ORAL at 12:56

## 2024-01-16 RX ADMIN — LEVOTHYROXINE SODIUM 25 MCG: 0.03 TABLET ORAL at 05:48

## 2024-01-16 RX ADMIN — IBUPROFEN 600 MG: 600 TABLET, FILM COATED ORAL at 18:04

## 2024-01-16 RX ADMIN — OXYCODONE HYDROCHLORIDE 5 MG: 5 TABLET ORAL at 21:56

## 2024-01-16 RX ADMIN — IBUPROFEN 600 MG: 600 TABLET, FILM COATED ORAL at 12:56

## 2024-01-16 RX ADMIN — DOCUSATE SODIUM 100 MG: 100 CAPSULE, LIQUID FILLED ORAL at 21:56

## 2024-01-16 RX ADMIN — OXYCODONE HYDROCHLORIDE 5 MG: 5 TABLET ORAL at 02:59

## 2024-01-16 RX ADMIN — SIMETHICONE 80 MG: 80 TABLET, CHEWABLE ORAL at 00:23

## 2024-01-16 RX ADMIN — ACETAMINOPHEN 650 MG: 325 TABLET, FILM COATED ORAL at 02:59

## 2024-01-16 RX ADMIN — DOCUSATE SODIUM 100 MG: 100 CAPSULE, LIQUID FILLED ORAL at 08:44

## 2024-01-16 RX ADMIN — OXYCODONE HYDROCHLORIDE 5 MG: 5 TABLET ORAL at 12:56

## 2024-01-16 RX ADMIN — SIMETHICONE 80 MG: 80 TABLET, CHEWABLE ORAL at 08:44

## 2024-01-16 RX ADMIN — ACETAMINOPHEN 650 MG: 325 TABLET, FILM COATED ORAL at 08:44

## 2024-01-16 RX ADMIN — SIMETHICONE 80 MG: 80 TABLET, CHEWABLE ORAL at 17:58

## 2024-01-16 RX ADMIN — IBUPROFEN 600 MG: 600 TABLET, FILM COATED ORAL at 05:48

## 2024-01-16 NOTE — PROGRESS NOTES
" POSTPARTUM ROUNDS    SUBJECTIVE:    CC:  POD2 from CS    Subjective:  Pt is doing well, pain is well controlled, pt is ambulating and tolerating a regular diet.  Voiding well.    Review of Systems - Negative except gas pains and cramping  NO FEVER OR CHILLS , NO NAUSEA OR VOMITING, NO LEG PAIN    OBJECTIVE:  Vital Signs: /65 (BP Location: Left arm, Patient Position: Lying)   Pulse 81   Temp 98 °F (36.7 °C) (Oral)   Resp 16   Ht 152.4 cm (60\")   Wt 55.8 kg (123 lb)   LMP 2023   SpO2 100%   Breastfeeding Yes   BMI 24.02 kg/m²     Intake/Output Summary (Last 24 hours) at 2024 1136  Last data filed at 1/15/2024 2330  Gross per 24 hour   Intake 1600 ml   Output 1950 ml   Net -350 ml       Constitutional: The patient is well nourished. Alert and oriented.  Mental status is upbeat, no signs postpartum depression.   Cardiovascular:RRR  Resp: nonlabored breathing  The incision is  clean, dry and Intact   Gastrointestinal: fundus firm below umbilicus  Extremities:no edema, non-tender bilateral    LABS / IMAGING:  Lab Results (last 24 hours)       ** No results found for the last 24 hours. **            ASSESSMENT AND PLAN:    POD 2 from  delivery:    Patient Active Problem List   Diagnosis    Subclinical hypothyroidism    Infertility, female    History of maternal fourth degree perineal laceration, currently pregnant    Pregnancy, unspecified gestational age    GBS (group B streptococcus) UTI complicating pregnancy    Maternal anemia in pregnancy, antepartum     delivery delivered       Patient is postop day 2 from LTCS  Patient is doing well   Encourage ambulation   Plan infant circ today    Nancy Chapman MD    2024  11:36 EST       "

## 2024-01-16 NOTE — LACTATION NOTE
Pt wanting assistance with latching baby. LC assisted pt with latching baby to right breast in cross cradle position. Baby had circumcision. Pt's milk is coming in. Baby latching well at this time. FOB is giving baby some supplemental formula ( parent's choice) per syringe. Discussed milk coming in and weaning off formula. Mom reports that was her plan to wean formula. Educated on cluster feeding. Encouraged to call LC as needed.    Lactation Consult Note    Evaluation Completed: 2024 15:58 EST  Patient Name: Ashley Ryan  :  1990  MRN:  [id number]     REFERRAL  INFORMATION:                          Date of Referral: 01/15/24   Person Making Referral: patient  Maternal Reason for Referral: breastfeeding currently, latch difficulty  Infant Reason for Referral: sleepy, other (see comments) (frenotomy just done)    DELIVERY HISTORY:        Skin to skin initiation date/time: 2024  9:10 AM   Skin to skin end date/time:           MATERNAL ASSESSMENT:                               INFANT ASSESSMENT:  Information for the patient's :  Ashley Ryan [[id number]]   No past medical history on file.                                                                                                   MATERNAL INFANT FEEDING:                                                                       EQUIPMENT TYPE:                                 BREAST PUMPING:          LACTATION REFERRALS:

## 2024-01-16 NOTE — LACTATION NOTE
"This note was copied from a baby's chart.  Mom reports baby latches with every feeding but gets \"angry, because he is hungry\" and she supplements with formula. Given hand pump, encouraged pumping every 3hrs to establish a good milk supply.  "

## 2024-01-17 PROBLEM — L73.9 FOLLICULITIS: Status: ACTIVE | Noted: 2024-01-17

## 2024-01-17 PROCEDURE — 87205 SMEAR GRAM STAIN: CPT | Performed by: OBSTETRICS & GYNECOLOGY

## 2024-01-17 PROCEDURE — 87147 CULTURE TYPE IMMUNOLOGIC: CPT | Performed by: OBSTETRICS & GYNECOLOGY

## 2024-01-17 PROCEDURE — 87070 CULTURE OTHR SPECIMN AEROBIC: CPT | Performed by: OBSTETRICS & GYNECOLOGY

## 2024-01-17 RX ORDER — CALCIUM CARBONATE 500 MG/1
1 TABLET, CHEWABLE ORAL 3 TIMES DAILY PRN
Status: DISCONTINUED | OUTPATIENT
Start: 2024-01-17 | End: 2024-01-18 | Stop reason: HOSPADM

## 2024-01-17 RX ORDER — CEPHALEXIN 500 MG/1
500 CAPSULE ORAL EVERY 6 HOURS SCHEDULED
Status: DISCONTINUED | OUTPATIENT
Start: 2024-01-17 | End: 2024-01-18 | Stop reason: HOSPADM

## 2024-01-17 RX ADMIN — LEVOTHYROXINE SODIUM 25 MCG: 0.03 TABLET ORAL at 06:48

## 2024-01-17 RX ADMIN — IBUPROFEN 600 MG: 600 TABLET, FILM COATED ORAL at 11:59

## 2024-01-17 RX ADMIN — ACETAMINOPHEN 650 MG: 325 TABLET, FILM COATED ORAL at 04:05

## 2024-01-17 RX ADMIN — ACETAMINOPHEN 650 MG: 325 TABLET, FILM COATED ORAL at 15:28

## 2024-01-17 RX ADMIN — DOCUSATE SODIUM 100 MG: 100 CAPSULE, LIQUID FILLED ORAL at 09:22

## 2024-01-17 RX ADMIN — ANTACID TABLETS 1 TABLET: 500 TABLET, CHEWABLE ORAL at 21:49

## 2024-01-17 RX ADMIN — CEPHALEXIN 500 MG: 500 CAPSULE ORAL at 11:59

## 2024-01-17 RX ADMIN — OXYCODONE HYDROCHLORIDE 5 MG: 5 TABLET ORAL at 11:59

## 2024-01-17 RX ADMIN — SIMETHICONE 80 MG: 80 TABLET, CHEWABLE ORAL at 21:49

## 2024-01-17 RX ADMIN — ACETAMINOPHEN 650 MG: 325 TABLET, FILM COATED ORAL at 09:22

## 2024-01-17 RX ADMIN — CEPHALEXIN 500 MG: 500 CAPSULE ORAL at 18:10

## 2024-01-17 RX ADMIN — IBUPROFEN 600 MG: 600 TABLET, FILM COATED ORAL at 18:10

## 2024-01-17 RX ADMIN — IBUPROFEN 600 MG: 600 TABLET, FILM COATED ORAL at 06:48

## 2024-01-17 RX ADMIN — DOCUSATE SODIUM 100 MG: 100 CAPSULE, LIQUID FILLED ORAL at 21:49

## 2024-01-17 RX ADMIN — SIMETHICONE 80 MG: 80 TABLET, CHEWABLE ORAL at 09:22

## 2024-01-17 RX ADMIN — IBUPROFEN 600 MG: 600 TABLET, FILM COATED ORAL at 00:49

## 2024-01-17 RX ADMIN — ACETAMINOPHEN 650 MG: 325 TABLET, FILM COATED ORAL at 21:49

## 2024-01-17 NOTE — PLAN OF CARE
Goal Outcome Evaluation:           Progress: improving  Outcome Evaluation: vitals stable, assessment wdl, pain controlled per po meds, culture pending for groin area-keflex started

## 2024-01-17 NOTE — PLAN OF CARE
Goal Outcome Evaluation:     PO day 2. VSS, up/ambulating, voiding, pain controlled with PO medication. Simethicone for gas pain. Bleeding WNL. Will continue to monitor.

## 2024-01-17 NOTE — PROGRESS NOTES
[facility]   PROGRESS NOTE    Post-Op Day 3 S/P   Subjective     Patient reports:  Pain is well controlled.  She is ambulating. Tolerating diet. Tolerating po -- normal.  Intake -- c/o of tolerating po solids.   Voiding - without difficulty; flatus reported.  Vaginal bleeding is light.    ROS:  Neuro: neg for headache  Pulm: neg for soa  GI: neg for n/v  : neg for heavy bleeding  Musculoskeletal: neg for leg pain    Objective      Vitals: Vital Signs Range for the last 24 hours  Temperature: Temp:  [98 °F (36.7 °C)-98.5 °F (36.9 °C)] 98 °F (36.7 °C)   Temp Source: Temp src: Oral   BP: BP: ()/(63-74) 120/74   Pulse: Heart Rate:  [72-80] 80   Respirations: Resp:  [16] 16   SPO2:     O2 Amount (l/min):     O2 Devices Device (Oxygen Therapy): room air   Weight:              Physical Exam    Lungs clear to auscultation bilaterally   Abdomen Soft, fundus firm at U-1   Incision  no drainage, no swelling, well approximated; numerous pinpoint pustular lesions associated with hair follicles below incision into left groin, a few pustules below right aspect of incision and above   Extremities Bilateral lower ext with trace edema; no cords or tenderness     Labs:    Lab Results   Component Value Date    WBC 11.76 (H) 01/15/2024    HGB 11.0 (L) 01/15/2024    HCT 32.5 (L) 01/15/2024    MCV 93.1 01/15/2024     01/15/2024   B Positive   Rubella Immune    Assessment & Plan        History of maternal fourth degree perineal laceration, currently pregnant    GBS (group B streptococcus) UTI complicating pregnancy    Maternal anemia in pregnancy, antepartum     delivery delivered    Folliculitis      Assessment:    Ashley Ryan is Day 3  post-partum  , Low Transverse : pt is doing well overall and without complaints    Folliculitis: numerous lesions extending around incision. Will start antibiotic therapy with keflex and culture site      Plan:   continue current care for now. Pt  considering discharge. Recommended short term follow-up of folliculitis in next 1 to 2 days. They want to consider        Janna Kulkarni MD  1/17/2024  10:10 EST

## 2024-01-17 NOTE — LACTATION NOTE
This note was copied from a baby's chart.  Mom feels like her milk supply is increasing now, baby is cluster feeding and she supplements with formula. Discussed engorgement management and encouraged to call for any assistance.

## 2024-01-17 NOTE — PLAN OF CARE
Goal Outcome Evaluation:      VSS, assessment WNL, pain controlled w/ medication, breast and bottle feeding, ambulating independently, voiding spontaneously, incision healing w/o signs of infection

## 2024-01-18 VITALS
BODY MASS INDEX: 24.15 KG/M2 | TEMPERATURE: 97.1 F | RESPIRATION RATE: 16 BRPM | WEIGHT: 123 LBS | OXYGEN SATURATION: 100 % | HEART RATE: 90 BPM | DIASTOLIC BLOOD PRESSURE: 69 MMHG | SYSTOLIC BLOOD PRESSURE: 109 MMHG | HEIGHT: 60 IN

## 2024-01-18 RX ORDER — DOCUSATE SODIUM 100 MG/1
100 CAPSULE, LIQUID FILLED ORAL 2 TIMES DAILY
Qty: 60 CAPSULE | Refills: 1 | Status: SHIPPED | OUTPATIENT
Start: 2024-01-18

## 2024-01-18 RX ORDER — IBUPROFEN 800 MG/1
800 TABLET ORAL EVERY 8 HOURS PRN
Qty: 50 TABLET | Refills: 1 | Status: SHIPPED | OUTPATIENT
Start: 2024-01-18

## 2024-01-18 RX ORDER — OXYCODONE HYDROCHLORIDE AND ACETAMINOPHEN 5; 325 MG/1; MG/1
1 TABLET ORAL EVERY 4 HOURS PRN
Qty: 20 TABLET | Refills: 0 | Status: SHIPPED | OUTPATIENT
Start: 2024-01-18 | End: 2024-01-22

## 2024-01-18 RX ORDER — CEPHALEXIN 500 MG/1
500 CAPSULE ORAL EVERY 6 HOURS SCHEDULED
Qty: 28 CAPSULE | Refills: 0 | Status: SHIPPED | OUTPATIENT
Start: 2024-01-18 | End: 2024-01-22 | Stop reason: SDUPTHER

## 2024-01-18 RX ADMIN — IBUPROFEN 600 MG: 600 TABLET, FILM COATED ORAL at 12:52

## 2024-01-18 RX ADMIN — OXYCODONE HYDROCHLORIDE 5 MG: 5 TABLET ORAL at 12:52

## 2024-01-18 RX ADMIN — ACETAMINOPHEN 650 MG: 325 TABLET, FILM COATED ORAL at 03:53

## 2024-01-18 RX ADMIN — CEPHALEXIN 500 MG: 500 CAPSULE ORAL at 12:51

## 2024-01-18 RX ADMIN — IBUPROFEN 600 MG: 600 TABLET, FILM COATED ORAL at 06:27

## 2024-01-18 RX ADMIN — OXYCODONE HYDROCHLORIDE 5 MG: 5 TABLET ORAL at 00:07

## 2024-01-18 RX ADMIN — DOCUSATE SODIUM 100 MG: 100 CAPSULE, LIQUID FILLED ORAL at 08:07

## 2024-01-18 RX ADMIN — SIMETHICONE 80 MG: 80 TABLET, CHEWABLE ORAL at 12:51

## 2024-01-18 RX ADMIN — IBUPROFEN 600 MG: 600 TABLET, FILM COATED ORAL at 00:02

## 2024-01-18 RX ADMIN — SIMETHICONE 80 MG: 80 TABLET, CHEWABLE ORAL at 08:10

## 2024-01-18 RX ADMIN — CEPHALEXIN 500 MG: 500 CAPSULE ORAL at 00:02

## 2024-01-18 RX ADMIN — CEPHALEXIN 500 MG: 500 CAPSULE ORAL at 06:27

## 2024-01-18 RX ADMIN — LEVOTHYROXINE SODIUM 25 MCG: 0.03 TABLET ORAL at 06:27

## 2024-01-18 NOTE — DISCHARGE SUMMARY
section Discharge Summary    Date of Admission: 2024  Date of Discharge:  2024      Patient: Ashley Ryan      MR#:[id number]    Surgeon/OB: Jessenia Singh MD    Discharge Diagnosis:  section at 38w2d, uncomplicated recovery    Procedures:  , Low Transverse     2024    8:18 AM      Anesthesia:  Spinal     Presenting Problem/History of Present Illness  History of maternal fourth degree perineal laceration, currently pregnant [O09.299]  History of fourth degree perineal laceration [Z87.59]     Patient Active Problem List   Diagnosis    Subclinical hypothyroidism    Infertility, female    History of maternal fourth degree perineal laceration, currently pregnant    Pregnancy, unspecified gestational age    GBS (group B streptococcus) UTI complicating pregnancy    Maternal anemia in pregnancy, antepartum     delivery delivered    Folliculitis       Hospital Course  Patient is a 33 y.o. female  at 38w2d status post  section with uneventful postoperative recovery.  Patient was advanced to regular diet on postoperative day#1.  On discharge, ambulating, tolerating a regular diet without any difficulties and her incision is dry, clean and intact. She developed a pustular folliculitis around her CS incision and plan for antibiotic therapy    Infant:   male  fetus 3070 g (6 lb 12.3 oz)  with Apgar scores of 9 , 9  at five minutes.    Condition on Discharge:  Stable    Vital Signs  Temp:  [97.1 °F (36.2 °C)-97.6 °F (36.4 °C)] 97.1 °F (36.2 °C)  Heart Rate:  [67-90] 90  Resp:  [16] 16  BP: ()/(58-69) 109/69    Lab Results   Component Value Date    WBC 11.76 (H) 01/15/2024    HGB 11.0 (L) 01/15/2024    HCT 32.5 (L) 01/15/2024    MCV 93.1 01/15/2024     01/15/2024       Discharge Disposition  Home or Self Care    Discharge Medications     Discharge Medications        New Medications        Instructions Start Date   cephalexin 500 MG  capsule  Commonly known as: KEFLEX   500 mg, Oral, Every 6 Hours Scheduled      docusate sodium 100 MG capsule  Commonly known as: Colace   100 mg, Oral, 2 Times Daily      ibuprofen 800 MG tablet  Commonly known as: ADVIL,MOTRIN   800 mg, Oral, Every 8 Hours PRN      mupirocin 2 % ointment  Commonly known as: BACTROBAN   1 application , Topical, 3 Times Daily      oxyCODONE-acetaminophen 5-325 MG per tablet  Commonly known as: Percocet   1 tablet, Oral, Every 4 Hours PRN             Continue These Medications        Instructions Start Date   calcium carbonate 500 MG chewable tablet  Commonly known as: TUMS   1 tablet, Oral, Daily      calcium carbonate 600 MG tablet  Commonly known as: OS-BLANCHE   600 mg, Oral, Daily      ferrous sulfate 325 (65 FE) MG tablet   325 mg, Oral, Daily      influenza vac split quad 0.5 ML suspension prefilled syringe injection  Commonly known as: FLUZONE,FLUARIX,AFLURIA,FLULAVAL   0.5 mL, Intramuscular, Once      levothyroxine 25 MCG tablet  Commonly known as: Synthroid   25 mcg, Oral, Daily      omeprazole 40 MG capsule  Commonly known as: priLOSEC   40 mg, Oral, Daily      PRE- PO   Oral               Discharge Diet: Regular    Follow-up Appointments  No future appointments.  Additional Instructions for the Follow-ups that You Need to Schedule       Call MD With Problems / Concerns   As directed      If you have newly increased incisional pain, drainage or bleeding from the incision, skin redness around the incision  If you have heavy bleeding, soaking through a pad per hour or passing large clots  If you have persistent headache, visual changes, upper abdominal pain  If you have persistent nausea, vomiting  If your pain is severe and uncontrollable    Order Comments: If you have newly increased incisional pain, drainage or bleeding from the incision, skin redness around the incision If you have heavy bleeding, soaking through a pad per hour or passing large clots If you have  persistent headache, visual changes, upper abdominal pain If you have persistent nausea, vomiting If your pain is severe and uncontrollable         Discharge Follow-up with Specified Provider: follow up with your primary OB provider in two weeks and in six weeks; 2 Weeks   As directed      To: follow up with your primary OB provider in two weeks and in six weeks   Follow Up: 2 Weeks   Follow Up Details: Call the office or follow up sooner if you are having any problems                Prenatal labs/vax: B+/I/-/- NR    Aamnda Ag MD  1/18/2024  12:00 EST

## 2024-01-18 NOTE — PLAN OF CARE
Goal Outcome Evaluation:      VSS, assessment WNL, pt ambulating independently, voiding spontaneously, pain controlled w/ medication, bleeding at a minimum and w/o clots, incision has localized blisters surrounding site pt educated on keeping the area clean and dry, breast and bottle feeding

## 2024-01-18 NOTE — LACTATION NOTE
P2,T  , planning on discharge today. Breast and supplementing with formula, mom's milk is in. Education provided: infant weight loss & return to birth weight in 10-14 days (about 2 weeks); Pediatrician to follow infant's weight; infant should be feeding at minimum 8 times/24 hours; expected output ;milk supply is in so avoid pacifiers and bf on demand following with formula if you choose.; signs of good latch; milk storage; engorgement management, blocked ducts, mastitis; [facility].    number on board. Have PBP.

## 2024-01-18 NOTE — PROGRESS NOTES
2024    Name:Ashley Ryan    MR#:[id number]     Progress Note:  Post-Op day 4 S/P    HD:4    Subjective   33 y.o. yo Female  s/p CS at 38w2d doing well. Pain well controlled. Tolerating regular diet and having flatus. Lochia normal. She notes that the more concentrated area of folliculitis at the left corner has improved but a few new scatter pustules have popped up now.    Patient Active Problem List   Diagnosis    Subclinical hypothyroidism    Infertility, female    History of maternal fourth degree perineal laceration, currently pregnant    Pregnancy, unspecified gestational age    GBS (group B streptococcus) UTI complicating pregnancy    Maternal anemia in pregnancy, antepartum     delivery delivered    Folliculitis        Objective    Vitals  Temp:  Temp:  [97.1 °F (36.2 °C)-97.6 °F (36.4 °C)] 97.1 °F (36.2 °C)  Temp src: Oral  BP:  BP: ()/(58-69) 109/69  Pulse:  Heart Rate:  [67-90] 90  RR:   Resp:  [16] 16  Weight: 55.8 kg (123 lb)  BMI:  Body mass index is 24.02 kg/m².      General Awake, alert, no distress  Abdomen Soft, non-distended, fundus firm, 2 cm below umbilicus, appropriately tender  Incision  Intact, no erythema or exudate  Extremities Calves NT bilaterally   Skin- scattered pustular lesions mostly concentrated near left corner but a few on the supoerior aspect of the incision as well, no erythema        No intake/output data recorded.    LABS:   Lab Results   Component Value Date    WBC 11.76 (H) 01/15/2024    HGB 11.0 (L) 01/15/2024    HCT 32.5 (L) 01/15/2024    MCV 93.1 01/15/2024     01/15/2024       Infant: male       Assessment  1.  POD 4  2. Pustular folliculitis: plan for oral antbx and also topical mupirocin    Plan: Doing well.  Routine postoperative care      History of maternal fourth degree perineal laceration, currently pregnant    GBS (group B streptococcus) UTI complicating pregnancy    Maternal anemia in pregnancy, antepartum      delivery delivered    Folliculitis      Amanda Ag MD  2024 10:57 EST

## 2024-01-19 LAB
BACTERIA SPEC AEROBE CULT: ABNORMAL
GRAM STN SPEC: ABNORMAL
GRAM STN SPEC: ABNORMAL

## 2024-01-19 RX ORDER — FLUCONAZOLE 150 MG/1
150 TABLET ORAL ONCE
Qty: 1 TABLET | Refills: 0 | Status: SHIPPED | OUTPATIENT
Start: 2024-01-19 | End: 2024-01-19

## 2024-01-22 ENCOUNTER — POSTPARTUM VISIT (OUTPATIENT)
Dept: OBSTETRICS AND GYNECOLOGY | Age: 34
End: 2024-01-22
Payer: COMMERCIAL

## 2024-01-22 ENCOUNTER — TELEPHONE (OUTPATIENT)
Dept: OBSTETRICS AND GYNECOLOGY | Age: 34
End: 2024-01-22

## 2024-01-22 VITALS
BODY MASS INDEX: 21.79 KG/M2 | WEIGHT: 111 LBS | HEIGHT: 60 IN | DIASTOLIC BLOOD PRESSURE: 60 MMHG | SYSTOLIC BLOOD PRESSURE: 108 MMHG

## 2024-01-22 DIAGNOSIS — L73.9 FOLLICULITIS: Primary | ICD-10-CM

## 2024-01-22 PROCEDURE — 99213 OFFICE O/P EST LOW 20 MIN: CPT | Performed by: OBSTETRICS & GYNECOLOGY

## 2024-01-22 RX ORDER — CEPHALEXIN 500 MG/1
500 CAPSULE ORAL EVERY 6 HOURS SCHEDULED
Qty: 20 CAPSULE | Refills: 0 | Status: SHIPPED | OUTPATIENT
Start: 2024-01-22 | End: 2024-01-27

## 2024-01-22 NOTE — PROGRESS NOTES
"  Chief complaint-incisional discharge    History of present illness- Patient is a 33 y.o.  who called today with complaint of some drainage on her underwear from her incision.  Patient is 8 days post .  She is on Keflex for some folliculitis.  The swab from the hospital grew out coag negative staph.        /60   Ht 152.4 cm (60\")   Wt 50.3 kg (111 lb)   LMP 2023   Breastfeeding Yes   BMI 21.68 kg/m²   Physical Exam  Abdominal:         The patient's incision is closed.  There are some scabbed areas along the incision and some dried blood.  No drainage is expressed.  The area is cleaned with hydrogen peroxide.  There is some mild erythema above the incision but no flocculence.  There is some bruising in the midline about 3 cm above the incision.    Pelvic ultrasound shows     Diagnoses and all orders for this visit:    1. Folliculitis (Primary)    Other orders  -     cephalexin (KEFLEX) 500 MG capsule; Take 1 capsule by mouth Every 6 (Six) Hours for 5 days. Indications: folliculitis  Dispense: 20 capsule; Refill: 0    Patient had what appeared to be some folliculitis at the hospital that I think was due to shaving of the pubic hair.  Patient did have very thick pubic hair.  At that time the pustules were below the incision and those seem to be cleared up.  I think the patient was worried about some areas of scab that are broken off and there was just a small area of blood on her underwear.  The incision appears intact.  Due to the erythema I am going to extend her Keflex for 7 more days.  She will follow-up with me in 1 week.  She will keep the area clean and dry with soap and water.  She will report any fevers or problems.  "

## 2024-01-22 NOTE — TELEPHONE ENCOUNTER
Pt's spouse is calling stating pt had csection on 1/14/24 & they have noticed some red & yellow fluid coming from incision site. Pt has postpartum visit scheduled on 1/25/24. Pt asking if they should be seen sooner for incision check. Please advise.

## 2024-01-25 ENCOUNTER — MATERNAL SCREENING (OUTPATIENT)
Dept: CALL CENTER | Facility: HOSPITAL | Age: 34
End: 2024-01-25
Payer: COMMERCIAL

## 2024-01-25 NOTE — OUTREACH NOTE
Maternal Screening Survey      Flowsheet Row Responses   Facility patient discharged from? [facility]   Attempt successful? No  [used [facility] no answer]   Unsuccessful attempts Attempt 1              LUPE MORRISON - Registered Nurse

## 2024-01-25 NOTE — OUTREACH NOTE
Maternal Screening Survey      Flowsheet Row Responses   Facility patient discharged from? [facility]   Attempt successful? No  [[facility]  used. ID [id number]]   Unsuccessful attempts Attempt 2              Felipe SÁNCHEZ - Registered Nurse

## 2024-01-26 ENCOUNTER — MATERNAL SCREENING (OUTPATIENT)
Dept: CALL CENTER | Facility: HOSPITAL | Age: 34
End: 2024-01-26
Payer: COMMERCIAL

## 2024-01-26 NOTE — OUTREACH NOTE
Maternal Screening Survey      Flowsheet Row Responses   Facility patient discharged from? [facility]   Attempt successful? No   Unsuccessful attempts Attempt 3  [spouse states pt unavailable]   Revoke Decline to participate              Crhistina LYNCH - Registered Nurse

## 2024-01-29 ENCOUNTER — POSTPARTUM VISIT (OUTPATIENT)
Dept: OBSTETRICS AND GYNECOLOGY | Age: 34
End: 2024-01-29
Payer: COMMERCIAL

## 2024-01-29 VITALS
WEIGHT: 107 LBS | HEIGHT: 60 IN | DIASTOLIC BLOOD PRESSURE: 66 MMHG | SYSTOLIC BLOOD PRESSURE: 108 MMHG | BODY MASS INDEX: 21.01 KG/M2

## 2024-01-29 DIAGNOSIS — Z98.890 POST-OPERATIVE STATE: Primary | ICD-10-CM

## 2024-01-29 PROBLEM — O23.40 GBS (GROUP B STREPTOCOCCUS) UTI COMPLICATING PREGNANCY: Status: RESOLVED | Noted: 2023-06-22 | Resolved: 2024-01-29

## 2024-01-29 PROBLEM — B95.1 GBS (GROUP B STREPTOCOCCUS) UTI COMPLICATING PREGNANCY: Status: RESOLVED | Noted: 2023-06-22 | Resolved: 2024-01-29

## 2024-01-29 PROBLEM — O09.299 HISTORY OF MATERNAL FOURTH DEGREE PERINEAL LACERATION, CURRENTLY PREGNANT: Status: RESOLVED | Noted: 2023-06-16 | Resolved: 2024-01-29

## 2024-01-29 PROBLEM — Z34.90 PREGNANCY, UNSPECIFIED GESTATIONAL AGE: Status: RESOLVED | Noted: 2023-06-16 | Resolved: 2024-01-29

## 2024-01-29 PROCEDURE — 0503F POSTPARTUM CARE VISIT: CPT | Performed by: OBSTETRICS & GYNECOLOGY

## 2024-01-29 NOTE — PROGRESS NOTES
"  Chief [id number]-uszovd-da of  incision    History of present illness- Patient is a 33 y.o.  who had some folliculitis under her  incision and had some mild redness on her  incision at her last visit.  She is now 2 weeks out from her .      /66   Ht 152.4 cm (60\")   Wt 48.5 kg (107 lb)   Breastfeeding Yes   BMI 20.90 kg/m²   Physical Exam  Constitutional:       General: She is not in acute distress.     Appearance: Normal appearance.   Abdominal:      Comments: There is more pronounced bruising above her incision.  On the incision there is some scabbed areas but the incision is clean dry and intact.  No surrounding erythema.  Abdomen is soft nondistended and nontender.   Neurological:      Mental Status: She is alert.   Psychiatric:         Mood and Affect: Mood normal.         Thought Content: Thought content normal.           Diagnoses and all orders for this visit:    1. Post-operative state (Primary)    We discussed the patient's incision.  It looks dry and intact and better than it did last week.  She has completed her Keflex.  She does have little bit worse bruising that I think was probably from some rectus bleeding after surgery.  She is doing well overall and will see me in 4 weeks.  She will need her TSH checked at that time.  "

## 2024-02-16 ENCOUNTER — TELEPHONE (OUTPATIENT)
Dept: OBSTETRICS AND GYNECOLOGY | Age: 34
End: 2024-02-16
Payer: COMMERCIAL

## 2024-02-16 NOTE — TELEPHONE ENCOUNTER
PT  states that he thinks she is having postpartum hives? Its been happening for the last 10 days off and on. It is all over her body comes and goes along with some itching. Delivered

## 2024-02-26 ENCOUNTER — POSTPARTUM VISIT (OUTPATIENT)
Dept: OBSTETRICS AND GYNECOLOGY | Age: 34
End: 2024-02-26
Payer: COMMERCIAL

## 2024-02-26 VITALS
HEIGHT: 60 IN | WEIGHT: 105 LBS | SYSTOLIC BLOOD PRESSURE: 108 MMHG | DIASTOLIC BLOOD PRESSURE: 60 MMHG | BODY MASS INDEX: 20.62 KG/M2

## 2024-02-26 DIAGNOSIS — E03.8 SUBCLINICAL HYPOTHYROIDISM: Primary | ICD-10-CM

## 2024-02-26 PROCEDURE — 0503F POSTPARTUM CARE VISIT: CPT | Performed by: OBSTETRICS & GYNECOLOGY

## 2024-02-26 RX ORDER — LORATADINE 10 MG/1
10 TABLET ORAL DAILY
Qty: 30 TABLET | Refills: 1 | Status: SHIPPED | OUTPATIENT
Start: 2024-02-26

## 2024-02-26 NOTE — PROGRESS NOTES
"Subjective   Ashley Ryan is a 34 y.o. female who presents for a postpartum visit. She is 6 weeks postpartum following a c/s.  The patient has been breaking out in some hives on her back.  She does feel stressed.  She is having trouble falling asleep.  Baby is doing well.  She is not sexually active.  She is not having vaginal bleeding.     The following portions of the patient's history were reviewed and updated as appropriate: allergies, current medications,and problem list.    Review of Systems  Pertinent items are noted in HPI.    Objective   /60   Ht 152.4 cm (60\")   Wt 47.6 kg (105 lb)   Breastfeeding Yes   BMI 20.51 kg/m²    General:  Alert and oriented, NAD    Breasts:         Heart:     Abdomen: Normal findings, nontender    Vulva: Normal, well-healed    Vagina: No lesions or abnormal discharge   Cervix:  Normal with no cervical motion tenderness   Corpus: Normal for post partum visit   Adnexa:  Non tender, non enlarged         Assessment & Plan     Normal postpartum exam. Pap smear not done at today's visit.  Hives-patient will try Claritin daily.  She will try Benadryl at night it will also help with sleep.  She can use topical hydrocortisone and topical Benadryl  Anxiety-patient does not want anything at this point but will call if she changes her mind.  She thinks if she can start to walk and get better sleep she will do better.  Contraception-condoms  Hypothyroidism-check TSH today   Follow up in 12 months or sooner as needed.           " Spoke to dad, confirmed appt for tomorrow with Dr Biswas. Informed dad of visitor policy

## 2024-02-27 LAB — TSH SERPL DL<=0.005 MIU/L-ACNC: 1.6 UIU/ML (ref 0.27–4.2)

## 2024-04-08 NOTE — TELEPHONE ENCOUNTER
Pt spouse Kinga calling , pt is having sleeping issues and want to know if that is cause by too much synthroid?  Is there any medication they can take to help with sleep while breastfeeding?   [de-identified] : Still complaining of pain in the neck back knees has restarted PT with Dr. Mcmullen currently MRI of the back has been denied did see neurosurgery wearing hinged brace on the right knee for about 10 years it is worn out also having some pain weakness and instability in the right ankle

## 2024-04-15 ENCOUNTER — TELEPHONE (OUTPATIENT)
Dept: OBSTETRICS AND GYNECOLOGY | Age: 34
End: 2024-04-15
Payer: COMMERCIAL

## 2024-04-15 NOTE — TELEPHONE ENCOUNTER
Please notify that some postpartum depression is very common.  We can send in Zoloft 50 mg 1 p.o. daily.  Hopefully that will help it will have its peak effect in 4 to 6 weeks.  If she still having trouble with sleep she could try Unisom.

## 2024-07-22 ENCOUNTER — HOSPITAL ENCOUNTER (EMERGENCY)
Facility: HOSPITAL | Age: 34
Discharge: HOME | End: 2024-07-22
Attending: EMERGENCY MEDICINE | Admitting: EMERGENCY MEDICINE
Payer: COMMERCIAL

## 2024-07-22 VITALS
RESPIRATION RATE: 16 BRPM | DIASTOLIC BLOOD PRESSURE: 87 MMHG | WEIGHT: 99 LBS | HEIGHT: 60 IN | SYSTOLIC BLOOD PRESSURE: 130 MMHG | OXYGEN SATURATION: 98 % | HEART RATE: 89 BPM | BODY MASS INDEX: 19.44 KG/M2 | TEMPERATURE: 98.9 F

## 2024-07-22 DIAGNOSIS — F41.8 POSTPARTUM ANXIETY: ICD-10-CM

## 2024-07-22 DIAGNOSIS — E87.6 HYPOKALEMIA: ICD-10-CM

## 2024-07-22 DIAGNOSIS — R25.3 MUSCLE TWITCHING: Primary | ICD-10-CM

## 2024-07-22 LAB
ALBUMIN SERPL-MCNC: 4.6 G/DL (ref 3.5–5.2)
ALBUMIN/GLOB SERPL: 1.5 G/DL
ALP SERPL-CCNC: 87 U/L (ref 39–117)
ALT SERPL W P-5'-P-CCNC: 22 U/L (ref 1–33)
ANION GAP SERPL CALCULATED.3IONS-SCNC: 10.4 MMOL/L (ref 5–15)
AST SERPL-CCNC: 14 U/L (ref 1–32)
BASOPHILS # BLD AUTO: 0.04 10*3/MM3 (ref 0–0.2)
BASOPHILS NFR BLD AUTO: 0.5 % (ref 0–1.5)
BILIRUB SERPL-MCNC: 0.3 MG/DL (ref 0–1.2)
BUN SERPL-MCNC: 16 MG/DL (ref 6–20)
BUN/CREAT SERPL: 23.2 (ref 7–25)
CALCIUM SPEC-SCNC: 9.4 MG/DL (ref 8.6–10.5)
CHLORIDE SERPL-SCNC: 103 MMOL/L (ref 98–107)
CO2 SERPL-SCNC: 26.6 MMOL/L (ref 22–29)
CREAT SERPL-MCNC: 0.69 MG/DL (ref 0.57–1)
DEPRECATED RDW RBC AUTO: 41.8 FL (ref 37–54)
EGFRCR SERPLBLD CKD-EPI 2021: 116.2 ML/MIN/1.73
EOSINOPHIL # BLD AUTO: 0.07 10*3/MM3 (ref 0–0.4)
EOSINOPHIL NFR BLD AUTO: 0.8 % (ref 0.3–6.2)
ERYTHROCYTE [DISTWIDTH] IN BLOOD BY AUTOMATED COUNT: 12.2 % (ref 12.3–15.4)
GLOBULIN UR ELPH-MCNC: 3.1 GM/DL
GLUCOSE SERPL-MCNC: 147 MG/DL (ref 65–99)
HCT VFR BLD AUTO: 42.8 % (ref 34–46.6)
HGB BLD-MCNC: 13.6 G/DL (ref 12–15.9)
IMM GRANULOCYTES # BLD AUTO: 0.02 10*3/MM3 (ref 0–0.05)
IMM GRANULOCYTES NFR BLD AUTO: 0.2 % (ref 0–0.5)
LYMPHOCYTES # BLD AUTO: 1.76 10*3/MM3 (ref 0.7–3.1)
LYMPHOCYTES NFR BLD AUTO: 20.6 % (ref 19.6–45.3)
MAGNESIUM SERPL-MCNC: 2.2 MG/DL (ref 1.6–2.6)
MCH RBC QN AUTO: 29.1 PG (ref 26.6–33)
MCHC RBC AUTO-ENTMCNC: 31.8 G/DL (ref 31.5–35.7)
MCV RBC AUTO: 91.5 FL (ref 79–97)
MONOCYTES # BLD AUTO: 0.63 10*3/MM3 (ref 0.1–0.9)
MONOCYTES NFR BLD AUTO: 7.4 % (ref 5–12)
NEUTROPHILS NFR BLD AUTO: 6.03 10*3/MM3 (ref 1.7–7)
NEUTROPHILS NFR BLD AUTO: 70.5 % (ref 42.7–76)
PLATELET # BLD AUTO: 271 10*3/MM3 (ref 140–450)
PMV BLD AUTO: 10.4 FL (ref 6–12)
POTASSIUM SERPL-SCNC: 3.3 MMOL/L (ref 3.5–5.2)
PROT SERPL-MCNC: 7.7 G/DL (ref 6–8.5)
RBC # BLD AUTO: 4.68 10*6/MM3 (ref 3.77–5.28)
SODIUM SERPL-SCNC: 140 MMOL/L (ref 136–145)
TROPONIN T SERPL HS-MCNC: <6 NG/L
WBC NRBC COR # BLD AUTO: 8.55 10*3/MM3 (ref 3.4–10.8)

## 2024-07-22 PROCEDURE — 36415 COLL VENOUS BLD VENIPUNCTURE: CPT

## 2024-07-22 PROCEDURE — 99284 EMERGENCY DEPT VISIT MOD MDM: CPT

## 2024-07-22 PROCEDURE — 85025 COMPLETE CBC W/AUTO DIFF WBC: CPT

## 2024-07-22 PROCEDURE — 83735 ASSAY OF MAGNESIUM: CPT

## 2024-07-22 PROCEDURE — 80053 COMPREHEN METABOLIC PANEL: CPT

## 2024-07-22 PROCEDURE — 99283 EMERGENCY DEPT VISIT LOW MDM: CPT

## 2024-07-22 PROCEDURE — 84484 ASSAY OF TROPONIN QUANT: CPT

## 2024-07-22 RX ORDER — BACLOFEN 10 MG/1
10 TABLET ORAL 3 TIMES DAILY
Qty: 20 TABLET | Refills: 0 | Status: SHIPPED | OUTPATIENT
Start: 2024-07-22

## 2024-07-22 RX ORDER — HYDROXYZINE HYDROCHLORIDE 25 MG/1
25 TABLET, FILM COATED ORAL ONCE
Status: COMPLETED | OUTPATIENT
Start: 2024-07-22 | End: 2024-07-22

## 2024-07-22 RX ORDER — SODIUM CHLORIDE 0.9 % (FLUSH) 0.9 %
10 SYRINGE (ML) INJECTION AS NEEDED
Status: DISCONTINUED | OUTPATIENT
Start: 2024-07-22 | End: 2024-07-22 | Stop reason: HOSPADM

## 2024-07-22 RX ORDER — HYDROXYZINE HYDROCHLORIDE 25 MG/1
25 TABLET, FILM COATED ORAL EVERY 6 HOURS PRN
Qty: 30 TABLET | Refills: 0 | Status: SHIPPED | OUTPATIENT
Start: 2024-07-22

## 2024-07-22 RX ORDER — LORAZEPAM 1 MG/1
1 TABLET ORAL ONCE
Status: COMPLETED | OUTPATIENT
Start: 2024-07-22 | End: 2024-07-22

## 2024-07-22 RX ORDER — POTASSIUM CHLORIDE 750 MG/1
20 TABLET, FILM COATED, EXTENDED RELEASE ORAL ONCE
Status: COMPLETED | OUTPATIENT
Start: 2024-07-22 | End: 2024-07-22

## 2024-07-22 RX ADMIN — HYDROXYZINE HYDROCHLORIDE 25 MG: 25 TABLET ORAL at 20:45

## 2024-07-22 RX ADMIN — POTASSIUM CHLORIDE 20 MEQ: 750 TABLET, EXTENDED RELEASE ORAL at 20:44

## 2024-07-22 RX ADMIN — LORAZEPAM 1 MG: 1 TABLET ORAL at 19:40

## 2024-07-23 NOTE — FSED PROVIDER NOTE
"Subjective   History of Present Illness  Patient is a 34-year-old female presents to the emergency department for facial twitching that onset today.  Patient reports over the past several days she has been hearing her jaw movements and her right ear more loudly.  Denies tinnitus, facial droop, weakness, slurred speech.  In general, patient has numerous physical complaints and concerns and \"wants everything checked out.\"  She reports some right shoulder pain that extends into her right sided neck over the past couple of weeks, likely from playing badminton so she has been taking a break.  Also worried about a small \"knot \"in her left-sided groin that she noticed a few weeks ago but is not causing her any issues.   is present provide additional HPI.  Reports patient has been very anxious ever since having their most recent child x 6 months ago.  She was prescribed Zoloft by OB/GYN, but they never started this medication.  Patient reports she is feeling very overwhelmed by 2 children, and having poor sleep.  Reports difficulty falling asleep due to anxiety.        Review of Systems   Constitutional:  Positive for fatigue. Negative for appetite change, chills, diaphoresis and fever.   HENT:  Negative for congestion, facial swelling, hearing loss and tinnitus.         Hearing changes, facial twitching   Respiratory:  Negative for cough and shortness of breath.    Cardiovascular:  Negative for chest pain.   Gastrointestinal:  Negative for abdominal pain.   Musculoskeletal:  Positive for myalgias and neck pain. Negative for gait problem, joint swelling and neck stiffness.   Skin:  Negative for color change, pallor, rash and wound.   Neurological:  Negative for dizziness, tremors, seizures, syncope, facial asymmetry, speech difficulty, weakness, light-headedness, numbness and headaches.   Psychiatric/Behavioral:  Positive for sleep disturbance. Negative for self-injury and suicidal ideas. The patient is " nervous/anxious.        Past Medical History:   Diagnosis Date    Abnormal uterine bleeding (AUB) 2018    Anemia due to acute blood loss 2021    Disease of thyroid gland     Fourth degree perineal laceration 2021    History of maternal fourth degree perineal laceration, currently pregnant     Iron deficiency anemia secondary to inadequate dietary iron intake 2021    Mastitis associated with childbirth, delivered 2021       Allergies   Allergen Reactions    Penicillins Rash       Past Surgical History:   Procedure Laterality Date     SECTION N/A 2024    Procedure:  SECTION PRIMARY;  Surgeon: Jessenia Singh MD;  Location: [facility] LABOR DELIVERY;  Service: Obstetrics;  Laterality: N/A;       Family History   Problem Relation Age of Onset    Heart disease Maternal Grandmother     No Known Problems Mother     No Known Problems Father        Social History     Socioeconomic History    Marital status:      Spouse name: Kinga   Tobacco Use    Smoking status: Never    Smokeless tobacco: Never   Vaping Use    Vaping status: Never Used   Substance and Sexual Activity    Alcohol use: No    Drug use: No    Sexual activity: Yes     Partners: Male           Objective   Physical Exam  Constitutional:       General: She is not in acute distress.     Appearance: She is normal weight. She is not ill-appearing, toxic-appearing or diaphoretic.   HENT:      Head: Normocephalic and atraumatic.      Comments: When patient opens mouth, bilateral rapid cheek muscle twitching occurs.     Right Ear: Tympanic membrane, ear canal and external ear normal.      Left Ear: Tympanic membrane, ear canal and external ear normal.      Ears:      Comments: Left TM partially obstructed by cerumen.     Nose: Nose normal.      Mouth/Throat:      Mouth: Mucous membranes are moist.      Pharynx: Oropharynx is clear. No oropharyngeal exudate or posterior oropharyngeal erythema.   Eyes:       Extraocular Movements: Extraocular movements intact.      Conjunctiva/sclera: Conjunctivae normal.      Pupils: Pupils are equal, round, and reactive to light.   Cardiovascular:      Rate and Rhythm: Normal rate and regular rhythm.   Pulmonary:      Effort: Pulmonary effort is normal. No respiratory distress.   Musculoskeletal:         General: Normal range of motion.      Cervical back: Normal range of motion. No rigidity or tenderness.      Comments: Very small palpable left groin mobile nodule.  Nonerythematous, nontender.   Lymphadenopathy:      Cervical: No cervical adenopathy.   Skin:     General: Skin is warm and dry.   Neurological:      General: No focal deficit present.      Mental Status: She is alert.      Cranial Nerves: Cranial nerves 2-12 are intact. No dysarthria or facial asymmetry.      Sensory: Sensation is intact.      Motor: Motor function is intact.      Coordination: Coordination is intact.      Gait: Gait is intact.   Psychiatric:         Attention and Perception: Attention normal.         Mood and Affect: Mood is anxious.         Speech: Speech is rapid and pressured. Speech is not slurred.         Behavior: Behavior is hyperactive. Behavior is cooperative.         Thought Content: Thought content normal.         Cognition and Memory: Cognition normal.         Procedures           ED Course                                           Medical Decision Making  Patient is a 34-year-old female who presents to the emergency department for a facial twitch.  Throughout the visit, she also has numerous other complaints and speaks more really about her generalized anxiety since being postpartum.   also provides a additional HPI regarding this.  Patient overall appears anxious but in no acute distress and nontoxic.  Vital signs are WNL.  Exam does reveal facial twitch.  Other findings are nonconcerning.  Labs check for any electrolyte abnormalities.  Patient has a mild hypokalemia, but lab work  is otherwise unremarkable.  Troponin checked to reassure the patient that her neck pain is not cardiac in origin.  I see no indication for any cardiac etiology.  Troponin is undetectable and this is a cardiac rule out in presence of symptoms ongoing 1 week.  Facial twitch is nearly completely resolved prior to discharge after ativan given earlier in visit.  No concerning neurological deficits or associated symptoms.  Patient provided reassurance.  Hydroxyzine given prior to discharge.  Muscle relaxer and hydroxyzine sent to pharmacy.  Discussed thoroughly close follow-up with OB/GYN and/or PCP for postpartum anxiety.  Strict return precautions given for any new, continued, worsening symptoms.  Discussed when to return to the emergency department.  Answered all questions.  Patient verbalized understanding and was agreeable to plan and discharge.    Problems Addressed:  Muscle twitching: complicated acute illness or injury    Amount and/or Complexity of Data Reviewed  Labs: ordered.    Risk  Prescription drug management.        Final diagnoses:   Muscle twitching   Postpartum anxiety   Hypokalemia       ED Disposition  ED Disposition       ED Disposition   Discharge    Condition   Stable    Comment   --               Pushpa Barrientos MD  [address] [address]  [phone]    Schedule an appointment as soon as possible for a visit            Medication List        New Prescriptions      baclofen 10 MG tablet  Commonly known as: LIORESAL  Take 1 tablet by mouth 3 (Three) Times a Day. Causes drowsiness.  Do not drive.     hydrOXYzine 25 MG tablet  Commonly known as: ATARAX  Take 1 tablet by mouth Every 6 (Six) Hours As Needed for Itching. Can cause drowsiness.               Where to Get Your Medications        These medications were sent to [facility] [id number] - [facility] [address] AT [facility] & [facility] [phone] [facility] [phone]   [address] [place] [address]  [address], [facility] [address]      Phone: [phone]   baclofen 10 MG tablet  hydrOXYzine 25 MG tablet

## 2024-07-23 NOTE — DISCHARGE INSTRUCTIONS
Your potassium was a little low, but otherwise your lab work looked great.  We gave you some potassium in the ER tonight, and I recommend a daily multivitamin or prenatal vitamin.  Your symptoms are more likely related to muscle fatigue, poor sleep, and/or anxiety.  I have also sent in 2 medications.  Baclofen is a muscle relaxer that might help with the muscle twitching occurs again.  Can cause drowsiness.  Do not drive.  The second medication is hydroxyzine, which can help with acute anxiety.  Take as prescribed as needed.  Can also cause drowsiness.  Do not drive.  Recommend close follow-up with your primary care regarding postpartum anxiety.  I have also attached information regarding this.  Immediately seek help or assistance if you have intense thoughts of self or other harm.  Continue to monitor your ear and facial symptoms closely, and return to emergency department for worsening symptoms or other medical emergencies.  Refer to the attached instructions for further information.

## 2024-08-13 ENCOUNTER — OFFICE VISIT (OUTPATIENT)
Dept: OBSTETRICS AND GYNECOLOGY | Age: 34
End: 2024-08-13
Payer: COMMERCIAL

## 2024-08-13 VITALS
DIASTOLIC BLOOD PRESSURE: 62 MMHG | BODY MASS INDEX: 19.04 KG/M2 | WEIGHT: 97 LBS | HEIGHT: 60 IN | SYSTOLIC BLOOD PRESSURE: 104 MMHG

## 2024-08-13 DIAGNOSIS — F41.9 ANXIETY: Primary | ICD-10-CM

## 2024-08-13 DIAGNOSIS — E03.8 SUBCLINICAL HYPOTHYROIDISM: ICD-10-CM

## 2024-08-13 PROCEDURE — 99213 OFFICE O/P EST LOW 20 MIN: CPT | Performed by: OBSTETRICS & GYNECOLOGY

## 2024-08-13 NOTE — PROGRESS NOTES
"  Chief complaint-insomnia and muscle aches    History of present illness- Patient is a 34 y.o.  who complains of insomnia and muscle aches.  Patient has been taking hydroxyzine to help with sleep.  She stopped breast-feeding 2 months ago.  She is now having regular cycles.  She is not sexually active.  She is on Synthroid.  She went to the emergency room because she did some exercise and had some muscle twitching.  Her potassium there was a low.  She has not seen her primary care doctor.  She is here today with her .  Her children are doing well.  She does find that her mind is racing and her heart beats quickly when she is trying to fall asleep.  They did do a troponin level at the ER and it was normal.  I gave her Zoloft at her last visit but she never started it.  They were wondering if she should have her hormones checked.         /62   Ht 152.4 cm (60\")   Wt 44 kg (97 lb)   LMP 2024   Breastfeeding No   BMI 18.94 kg/m²   Physical Exam  Constitutional:       Comments: Thin 97 pound female who appears anxious.   HENT:      Head: Normocephalic.   Pulmonary:      Effort: Pulmonary effort is normal.     Pap smear is up-to-date      Diagnoses and all orders for this visit:    1. Anxiety (Primary)  -     Comprehensive Metabolic Panel  -     TSH    2. Subclinical hypothyroidism  -     Comprehensive Metabolic Panel  -     TSH    I had a long discussion with the patient and her .  Suspect the patient has anxiety and some depression.  Encouraged the patient to start Zoloft.  We discussed risk and benefits of the medication.  Patient does not seem like she is going to start it.  Encouraged regular sleep.  Patient can continue the antihistamine  Check TSH today.  Her potassium was slightly low.  Recheck that today.  Encouraged potassium rich foods.  Encouraged stretching and exercise and healthy diet.  Discussed that I do not recommend checking hormones other than TSH since she is " having regular cycles.  Patient could do a trial of oral contraceptive pill to see if this would help any of her symptoms.   I encouraged the patient to see her primary care doctor

## 2024-08-14 LAB
ALBUMIN SERPL-MCNC: 4.4 G/DL (ref 3.5–5.2)
ALBUMIN/GLOB SERPL: 1.5 G/DL
ALP SERPL-CCNC: 78 U/L (ref 39–117)
ALT SERPL-CCNC: 16 U/L (ref 1–33)
AST SERPL-CCNC: 16 U/L (ref 1–32)
BILIRUB SERPL-MCNC: <0.2 MG/DL (ref 0–1.2)
BUN SERPL-MCNC: 14 MG/DL (ref 6–20)
BUN/CREAT SERPL: 19.4 (ref 7–25)
CALCIUM SERPL-MCNC: 9.9 MG/DL (ref 8.6–10.5)
CHLORIDE SERPL-SCNC: 105 MMOL/L (ref 98–107)
CO2 SERPL-SCNC: 27 MMOL/L (ref 22–29)
CREAT SERPL-MCNC: 0.72 MG/DL (ref 0.57–1)
EGFRCR SERPLBLD CKD-EPI 2021: 112 ML/MIN/1.73
GLOBULIN SER CALC-MCNC: 2.9 GM/DL
GLUCOSE SERPL-MCNC: 108 MG/DL (ref 65–99)
POTASSIUM SERPL-SCNC: 3.8 MMOL/L (ref 3.5–5.2)
PROT SERPL-MCNC: 7.3 G/DL (ref 6–8.5)
SODIUM SERPL-SCNC: 142 MMOL/L (ref 136–145)
TSH SERPL DL<=0.005 MIU/L-ACNC: 1.84 UIU/ML (ref 0.27–4.2)

## 2024-08-26 ENCOUNTER — HOSPITAL ENCOUNTER (EMERGENCY)
Facility: HOSPITAL | Age: 34
Discharge: HOME | End: 2024-08-26
Attending: EMERGENCY MEDICINE | Admitting: EMERGENCY MEDICINE
Payer: COMMERCIAL

## 2024-08-26 VITALS
WEIGHT: 98 LBS | BODY MASS INDEX: 19.24 KG/M2 | OXYGEN SATURATION: 98 % | DIASTOLIC BLOOD PRESSURE: 88 MMHG | TEMPERATURE: 98.1 F | RESPIRATION RATE: 16 BRPM | HEIGHT: 60 IN | HEART RATE: 97 BPM | SYSTOLIC BLOOD PRESSURE: 142 MMHG

## 2024-08-26 DIAGNOSIS — L23.9 ALLERGIC DERMATITIS: Primary | ICD-10-CM

## 2024-08-26 DIAGNOSIS — F41.9 ANXIETY: ICD-10-CM

## 2024-08-26 LAB
ALBUMIN SERPL-MCNC: 4.6 G/DL (ref 3.5–5.2)
ALBUMIN/GLOB SERPL: 1.4 G/DL
ALP SERPL-CCNC: 90 U/L (ref 39–117)
ALT SERPL W P-5'-P-CCNC: 17 U/L (ref 1–33)
ANION GAP SERPL CALCULATED.3IONS-SCNC: 9.3 MMOL/L (ref 5–15)
AST SERPL-CCNC: 15 U/L (ref 1–32)
B-HCG UR QL: NEGATIVE
BASOPHILS # BLD AUTO: 0.03 10*3/MM3 (ref 0–0.2)
BASOPHILS NFR BLD AUTO: 0.3 % (ref 0–1.5)
BILIRUB SERPL-MCNC: 0.2 MG/DL (ref 0–1.2)
BUN SERPL-MCNC: 11 MG/DL (ref 6–20)
BUN/CREAT SERPL: 15.7 (ref 7–25)
CALCIUM SPEC-SCNC: 9.8 MG/DL (ref 8.6–10.5)
CHLORIDE SERPL-SCNC: 100 MMOL/L (ref 98–107)
CO2 SERPL-SCNC: 28.7 MMOL/L (ref 22–29)
CREAT SERPL-MCNC: 0.7 MG/DL (ref 0.57–1)
DEPRECATED RDW RBC AUTO: 43 FL (ref 37–54)
EGFRCR SERPLBLD CKD-EPI 2021: 115.8 ML/MIN/1.73
EOSINOPHIL # BLD AUTO: 0.07 10*3/MM3 (ref 0–0.4)
EOSINOPHIL NFR BLD AUTO: 0.7 % (ref 0.3–6.2)
ERYTHROCYTE [DISTWIDTH] IN BLOOD BY AUTOMATED COUNT: 12.7 % (ref 12.3–15.4)
GLOBULIN UR ELPH-MCNC: 3.4 GM/DL
GLUCOSE SERPL-MCNC: 154 MG/DL (ref 65–99)
HCT VFR BLD AUTO: 42.1 % (ref 34–46.6)
HGB BLD-MCNC: 13.7 G/DL (ref 12–15.9)
IMM GRANULOCYTES # BLD AUTO: 0.02 10*3/MM3 (ref 0–0.05)
IMM GRANULOCYTES NFR BLD AUTO: 0.2 % (ref 0–0.5)
LYMPHOCYTES # BLD AUTO: 1.54 10*3/MM3 (ref 0.7–3.1)
LYMPHOCYTES NFR BLD AUTO: 15.1 % (ref 19.6–45.3)
MAGNESIUM SERPL-MCNC: 2.2 MG/DL (ref 1.6–2.6)
MCH RBC QN AUTO: 29.8 PG (ref 26.6–33)
MCHC RBC AUTO-ENTMCNC: 32.5 G/DL (ref 31.5–35.7)
MCV RBC AUTO: 91.7 FL (ref 79–97)
MONOCYTES # BLD AUTO: 0.5 10*3/MM3 (ref 0.1–0.9)
MONOCYTES NFR BLD AUTO: 4.9 % (ref 5–12)
NEUTROPHILS NFR BLD AUTO: 78.8 % (ref 42.7–76)
NEUTROPHILS NFR BLD AUTO: 8.04 10*3/MM3 (ref 1.7–7)
PLATELET # BLD AUTO: 269 10*3/MM3 (ref 140–450)
PMV BLD AUTO: 10.3 FL (ref 6–12)
POTASSIUM SERPL-SCNC: 3.9 MMOL/L (ref 3.5–5.2)
PROT SERPL-MCNC: 8 G/DL (ref 6–8.5)
RBC # BLD AUTO: 4.59 10*6/MM3 (ref 3.77–5.28)
SODIUM SERPL-SCNC: 138 MMOL/L (ref 136–145)
WBC NRBC COR # BLD AUTO: 10.2 10*3/MM3 (ref 3.4–10.8)

## 2024-08-26 PROCEDURE — 99284 EMERGENCY DEPT VISIT MOD MDM: CPT | Performed by: EMERGENCY MEDICINE

## 2024-08-26 PROCEDURE — 86592 SYPHILIS TEST NON-TREP QUAL: CPT | Performed by: EMERGENCY MEDICINE

## 2024-08-26 PROCEDURE — 25010000002 METHYLPREDNISOLONE PER 125 MG: Performed by: EMERGENCY MEDICINE

## 2024-08-26 PROCEDURE — 86618 LYME DISEASE ANTIBODY: CPT | Performed by: EMERGENCY MEDICINE

## 2024-08-26 PROCEDURE — 87798 DETECT AGENT NOS DNA AMP: CPT | Performed by: EMERGENCY MEDICINE

## 2024-08-26 PROCEDURE — 25010000002 DIPHENHYDRAMINE PER 50 MG: Performed by: EMERGENCY MEDICINE

## 2024-08-26 PROCEDURE — 96374 THER/PROPH/DIAG INJ IV PUSH: CPT

## 2024-08-26 PROCEDURE — 99283 EMERGENCY DEPT VISIT LOW MDM: CPT

## 2024-08-26 PROCEDURE — 81025 URINE PREGNANCY TEST: CPT | Performed by: EMERGENCY MEDICINE

## 2024-08-26 PROCEDURE — 83735 ASSAY OF MAGNESIUM: CPT | Performed by: EMERGENCY MEDICINE

## 2024-08-26 PROCEDURE — 80053 COMPREHEN METABOLIC PANEL: CPT | Performed by: EMERGENCY MEDICINE

## 2024-08-26 PROCEDURE — 85025 COMPLETE CBC W/AUTO DIFF WBC: CPT | Performed by: EMERGENCY MEDICINE

## 2024-08-26 PROCEDURE — 96375 TX/PRO/DX INJ NEW DRUG ADDON: CPT

## 2024-08-26 RX ORDER — PREDNISONE 20 MG/1
TABLET ORAL
Qty: 9 TABLET | Refills: 0 | Status: SHIPPED | OUTPATIENT
Start: 2024-08-26

## 2024-08-26 RX ORDER — METHYLPREDNISOLONE SODIUM SUCCINATE 125 MG/2ML
80 INJECTION, POWDER, LYOPHILIZED, FOR SOLUTION INTRAMUSCULAR; INTRAVENOUS ONCE
Status: COMPLETED | OUTPATIENT
Start: 2024-08-26 | End: 2024-08-26

## 2024-08-26 RX ORDER — LORAZEPAM 0.5 MG/1
0.5 TABLET ORAL EVERY 6 HOURS PRN
Qty: 12 TABLET | Refills: 0 | Status: SHIPPED | OUTPATIENT
Start: 2024-08-26 | End: 2024-08-29

## 2024-08-26 RX ORDER — LORAZEPAM 1 MG/1
0.5 TABLET ORAL ONCE
Status: COMPLETED | OUTPATIENT
Start: 2024-08-26 | End: 2024-08-26

## 2024-08-26 RX ORDER — DIPHENHYDRAMINE HYDROCHLORIDE 50 MG/ML
25 INJECTION INTRAMUSCULAR; INTRAVENOUS ONCE
Status: COMPLETED | OUTPATIENT
Start: 2024-08-26 | End: 2024-08-26

## 2024-08-26 RX ORDER — SODIUM CHLORIDE 0.9 % (FLUSH) 0.9 %
10 SYRINGE (ML) INJECTION AS NEEDED
Status: DISCONTINUED | OUTPATIENT
Start: 2024-08-26 | End: 2024-08-27 | Stop reason: HOSPADM

## 2024-08-26 RX ADMIN — METHYLPREDNISOLONE SODIUM SUCCINATE 80 MG: 125 INJECTION INTRAMUSCULAR; INTRAVENOUS at 19:56

## 2024-08-26 RX ADMIN — DIPHENHYDRAMINE HYDROCHLORIDE 25 MG: 50 INJECTION, SOLUTION INTRAMUSCULAR; INTRAVENOUS at 19:56

## 2024-08-26 RX ADMIN — LORAZEPAM 0.5 MG: 1 TABLET ORAL at 22:07

## 2024-08-27 LAB — RPR SER QL: NORMAL

## 2024-08-27 NOTE — DISCHARGE INSTRUCTIONS
Tonight I do think your rash is an allergic reaction.  It is very difficult to ascertain the inciting allergen.  It may be environmental but also may be a viral allergen.    I did send off some testing to make sure it is not associated with any tick bites.  We will follow that here in the emergency department and will notify you by phone if we need to change treatment.    I am going to continue a course of steroids as well as Benadryl every 6 hours.    I also do think you are having some mild increased in anxiety.  I did send a short course of Ativan to your pharmacy.    Please read all of the instructions in this handout.  If you receive prescriptions please fill them and take them as directed.  Please call your primary care physician for follow-up appointment in the next 5 to 7 days.  If you do not have a physician you may call the [facility] referral line at [phone].    You may return to the emergency department at any time for any concerns such as worsening symptoms.  If you received a work or school note it will be printed at the back of this packet.

## 2024-08-27 NOTE — FSED PROVIDER NOTE
EMERGENCY DEPARTMENT ENCOUNTER    Room Number:    Date seen:  2024  Time seen: 21:33 EDT  PCP: Pushpa Barrientos MD  Historian:     Discussed/obtained information from independent historians:     HPI:    Patient is a 34-year-old female.  She presents with an upper extremity maculopapular rash that has now spread to her chest and back.  There is been no fever no chills.  There has been itching.  No shortness of breath.  No known exposure.  She has been using some topical medications but this presents with development of the rash.  No known insect or tick bite     external (non-ED) record review:        Chronic or social conditions impacting care:    ALLERGIES  Penicillins    PAST MEDICAL HISTORY  Active Ambulatory Problems     Diagnosis Date Noted    Subclinical hypothyroidism 2018    Infertility, female 2020    Maternal anemia in pregnancy, antepartum 2023    Folliculitis 2024     Resolved Ambulatory Problems     Diagnosis Date Noted    Abnormal uterine bleeding (AUB) 2018    Elevated prolactin level 2018    Pregnancy, unspecified gestational age 10/05/2020    Low-lying placenta 2021    Iron deficiency anemia secondary to inadequate dietary iron intake 2021    Pregnancy 2021    Anemia due to acute blood loss 2021    Fourth degree perineal laceration 2021     (normal spontaneous vaginal delivery) 2021    Mastitis associated with childbirth, delivered 2021    History of maternal fourth degree perineal laceration, currently pregnant 2023    Pregnancy, unspecified gestational age 2023    GBS (group B streptococcus) UTI complicating pregnancy 2023    History of fourth degree perineal laceration 2024     delivery delivered 2024     Past Medical History:   Diagnosis Date    Disease of thyroid gland        PAST SURGICAL HISTORY  Past Surgical History:   Procedure Laterality Date      SECTION N/A 2024    Procedure:  SECTION PRIMARY;  Surgeon: Jessenia Singh MD;  Location: [facility] LABOR DELIVERY;  Service: Obstetrics;  Laterality: N/A;       FAMILY HISTORY  Family History   Problem Relation Age of Onset    Heart disease Maternal Grandmother     No Known Problems Mother     No Known Problems Father        SOCIAL HISTORY  Social History     Socioeconomic History    Marital status:      Spouse name: Kinga   Tobacco Use    Smoking status: Never    Smokeless tobacco: Never   Vaping Use    Vaping status: Never Used   Substance and Sexual Activity    Alcohol use: No    Drug use: No    Sexual activity: Yes     Partners: Male       REVIEW OF SYSTEMS  Review of Systems    All systems reviewed and negative except for those discussed in HPI.       PHYSICAL EXAM    I have reviewed the triage vital signs and nursing notes.  Vitals:    24 1901   BP: 142/88   Pulse: 97   Resp: 16   Temp: 98.1 °F (36.7 °C)   SpO2: 98%     Physical Exam    Vital signs: Reviewed in nurses notes    General: Patient is awake alert no acute distress    HEENT: Normocephalic atraumatic nasopharynx clear pharynx clear and moist    Neck:   Supple without lymphadenopathy    Respiratory:   Nonlabored respirations.  Clear to auscultation bilaterally.  Equal breath sounds bilaterally.  No wheezes or stridor noted.    Cardiovascular: Regular rate and rhythm.  No murmur.  Equal pulses in bilateral lower extremities without edema.    Abdomen: Nondistended    Skin:   There is a maculopapular rash on bilateral upper extremities chest abdomen and back.  No cellulitis, no petechiae.  It is pruritic in nature    Neurological examination: Patient is awake alert oriented x4.  Speech is normal.  No facial palsy.  No focal motor or sensory deficits.    Psychological: Patient is exceedingly anxious.  No suicidal or homicidal ideations    LAB RESULTS  Recent Results (from the past 24 hour(s))   Comprehensive Metabolic Panel     Collection Time: 08/26/24  7:39 PM    Specimen: Blood   Result Value Ref Range    Glucose 154 (H) 65 - 99 mg/dL    BUN 11 6 - 20 mg/dL    Creatinine 0.70 0.57 - 1.00 mg/dL    Sodium 138 136 - 145 mmol/L    Potassium 3.9 3.5 - 5.2 mmol/L    Chloride 100 98 - 107 mmol/L    CO2 28.7 22.0 - 29.0 mmol/L    Calcium 9.8 8.6 - 10.5 mg/dL    Total Protein 8.0 6.0 - 8.5 g/dL    Albumin 4.6 3.5 - 5.2 g/dL    ALT (SGPT) 17 1 - 33 U/L    AST (SGOT) 15 1 - 32 U/L    Alkaline Phosphatase 90 39 - 117 U/L    Total Bilirubin 0.2 0.0 - 1.2 mg/dL    Globulin 3.4 gm/dL    A/G Ratio 1.4 g/dL    BUN/Creatinine Ratio 15.7 7.0 - 25.0    Anion Gap 9.3 5.0 - 15.0 mmol/L    eGFR 115.8 >60.0 mL/min/1.73   Magnesium    Collection Time: 08/26/24  7:39 PM    Specimen: Blood   Result Value Ref Range    Magnesium 2.2 1.6 - 2.6 mg/dL   CBC Auto Differential    Collection Time: 08/26/24  7:39 PM    Specimen: Blood   Result Value Ref Range    WBC 10.20 3.40 - 10.80 10*3/mm3    RBC 4.59 3.77 - 5.28 10*6/mm3    Hemoglobin 13.7 12.0 - 15.9 g/dL    Hematocrit 42.1 34.0 - 46.6 %    MCV 91.7 79.0 - 97.0 fL    MCH 29.8 26.6 - 33.0 pg    MCHC 32.5 31.5 - 35.7 g/dL    RDW 12.7 12.3 - 15.4 %    RDW-SD 43.0 37.0 - 54.0 fl    MPV 10.3 6.0 - 12.0 fL    Platelets 269 140 - 450 10*3/mm3    Neutrophil % 78.8 (H) 42.7 - 76.0 %    Lymphocyte % 15.1 (L) 19.6 - 45.3 %    Monocyte % 4.9 (L) 5.0 - 12.0 %    Eosinophil % 0.7 0.3 - 6.2 %    Basophil % 0.3 0.0 - 1.5 %    Immature Grans % 0.2 0.0 - 0.5 %    Neutrophils, Absolute 8.04 (H) 1.70 - 7.00 10*3/mm3    Lymphocytes, Absolute 1.54 0.70 - 3.10 10*3/mm3    Monocytes, Absolute 0.50 0.10 - 0.90 10*3/mm3    Eosinophils, Absolute 0.07 0.00 - 0.40 10*3/mm3    Basophils, Absolute 0.03 0.00 - 0.20 10*3/mm3    Immature Grans, Absolute 0.02 0.00 - 0.05 10*3/mm3   Pregnancy, Urine - Urine, Clean Catch    Collection Time: 08/26/24  7:40 PM    Specimen: Urine, Clean Catch   Result Value Ref Range    HCG, Urine QL Negative Negative        Ordered the above labs and independently interpreted results.  My findings will be discussed in the ED course or medical decision making section below      PROCEDURES:  Procedures      RADIOLOGY RESULTS  No Radiology Exams Resulted Within Past 24 Hours     Ordered the above noted radiological studies.  Independently interpreted by me.  My findings will be discussed in the medical decision section below.     PROGRESS, DATA ANALYSIS, CONSULTS AND MEDICAL DECISION MAKING    Please note that this section constitutes my independent interpretation of clinical data including lab results, radiology, EKG's.  This constitutes my independent professional opinion regarding differential diagnosis and management of this patient.  It may include any factors such as history from outside sources, review of external records, social determinants of health, management of medications, response to those treatments, and discussions with other providers.       Orders placed during this visit:  Orders Placed This Encounter   Procedures    Comprehensive Metabolic Panel    Magnesium    Pregnancy, Urine - Urine, Clean Catch    Ehrlichia Profile DNA PCR    Rickettsia Species DNA, Real-Time PCR    RPR Qualitative with Reflex to Quant    Lyme Disease Total Antibody With Reflex to Immunoassay    CBC Auto Differential    CBC & Differential    ED Acknowledgement Form Needed;       Medications   methylPREDNISolone sodium succinate (SOLU-Medrol) injection 80 mg (80 mg Intravenous Given 8/26/24 1956)   diphenhydrAMINE (BENADRYL) injection 25 mg (25 mg Intravenous Given 8/26/24 1956)   LORazepam (ATIVAN) tablet 0.5 mg (0.5 mg Oral Given 8/26/24 2207)            Medical Decision Making  Problems Addressed:  Allergic dermatitis: complicated acute illness or injury  Anxiety: complicated acute illness or injury    Amount and/or Complexity of Data Reviewed  Labs: ordered.    Risk  Prescription drug management.            DIAGNOSIS  Final diagnoses:    Allergic dermatitis   Anxiety          Medication List        New Prescriptions      LORazepam 0.5 MG tablet  Commonly known as: ATIVAN  Take 1 tablet by mouth Every 6 (Six) Hours As Needed for Anxiety for up to 3 days.     predniSONE 20 MG tablet  Commonly known as: DELTASONE  2 po daily x 3d, then 1 po daily x 3d.               Where to Get Your Medications        These medications were sent to [facility] [id number] - [place] [address] AT [facility] - [phone] [facility] [phone] [place] [address], [facility] [address]      Phone: [phone]   LORazepam 0.5 MG tablet  predniSONE 20 MG tablet         FOLLOW-UP  Pushpa Barrientos MD  [address] [id number] [phone]    In 1 week          Latest Documented Vital Signs:  As of 03:50 EDT  BP- 142/88 HR- 97 Temp- 98.1 °F (36.7 °C) (Oral) O2 sat- 98%    Appropriate PPE utilized throughout this patient encounter to include mask, if indicated, per current protocol. Hand hygiene was performed before donning PPE and after removal when leaving the room.    Please note that portions of this were completed with a voice recognition program.     Note Disclaimer: At [facility], we believe that sharing information builds trust and better relationships. You are receiving this note because you are receiving care at [facility] or recently visited. It is possible you will see health information before a provider has talked with you about it. This kind of information can be easy to misunderstand. To help you fully understand what it means for your health, we urge you to discuss this note with your provider.            [

## 2024-08-28 LAB — B BURGDOR IGG+IGM SER QL IA: NEGATIVE

## 2024-09-01 LAB — RICKETTSIA RICKETTSII DNA, RT: NOT DETECTED

## 2024-09-03 RX ORDER — LEVOTHYROXINE SODIUM 25 UG/1
25 TABLET ORAL DAILY
Qty: 90 TABLET | Refills: 3 | Status: SHIPPED | OUTPATIENT
Start: 2024-09-03

## (undated) DEVICE — ADHS SKIN PREMIERPRO EXOFIN TOPICAL HI/VISC .5ML

## (undated) DEVICE — GLV SURG BIOGEL LTX PF 6

## (undated) DEVICE — SOL IRR H2O BTL 1000ML STRL

## (undated) DEVICE — ANTIBACTERIAL UNDYED BRAIDED (POLYGLACTIN 910), SYNTHETIC ABSORBABLE SUTURE: Brand: COATED VICRYL

## (undated) DEVICE — SUT VIC PLS 0 CTX 36IN UD VCP978H

## (undated) DEVICE — SUT MNCRYL PLS ANTIB UD 4/0 PS2 18IN

## (undated) DEVICE — SLV SCD CALF HEMOFORCE DVT THERP REPROC MD